# Patient Record
Sex: FEMALE | Race: OTHER | NOT HISPANIC OR LATINO | URBAN - METROPOLITAN AREA
[De-identification: names, ages, dates, MRNs, and addresses within clinical notes are randomized per-mention and may not be internally consistent; named-entity substitution may affect disease eponyms.]

---

## 2024-08-09 ENCOUNTER — OUTPATIENT (OUTPATIENT)
Dept: OUTPATIENT SERVICES | Facility: HOSPITAL | Age: 34
LOS: 1 days | End: 2024-08-09
Payer: MEDICAID

## 2024-08-09 VITALS
OXYGEN SATURATION: 98 % | TEMPERATURE: 98 F | RESPIRATION RATE: 19 BRPM | DIASTOLIC BLOOD PRESSURE: 84 MMHG | SYSTOLIC BLOOD PRESSURE: 123 MMHG | HEART RATE: 86 BPM

## 2024-08-09 DIAGNOSIS — O26.899 OTHER SPECIFIED PREGNANCY RELATED CONDITIONS, UNSPECIFIED TRIMESTER: ICD-10-CM

## 2024-08-09 LAB
APPEARANCE UR: CLEAR — SIGNIFICANT CHANGE UP
BACTERIA # UR AUTO: ABNORMAL /HPF
BILIRUB UR-MCNC: NEGATIVE — SIGNIFICANT CHANGE UP
COLOR SPEC: YELLOW — SIGNIFICANT CHANGE UP
COMMENT - URINE: SIGNIFICANT CHANGE UP
DIFF PNL FLD: NEGATIVE — SIGNIFICANT CHANGE UP
EPI CELLS # UR: PRESENT
GLUCOSE BLDC GLUCOMTR-MCNC: 115 MG/DL — HIGH (ref 70–99)
GLUCOSE UR QL: 100 MG/DL
KETONES UR-MCNC: NEGATIVE MG/DL — SIGNIFICANT CHANGE UP
LEUKOCYTE ESTERASE UR-ACNC: ABNORMAL
NITRITE UR-MCNC: NEGATIVE — SIGNIFICANT CHANGE UP
PH UR: 6.5 — SIGNIFICANT CHANGE UP (ref 5–8)
PROT UR-MCNC: NEGATIVE MG/DL — SIGNIFICANT CHANGE UP
RBC CASTS # UR COMP ASSIST: 0 /HPF — SIGNIFICANT CHANGE UP (ref 0–4)
SP GR SPEC: 1.01 — SIGNIFICANT CHANGE UP (ref 1–1.03)
UROBILINOGEN FLD QL: 1 MG/DL — SIGNIFICANT CHANGE UP (ref 0.2–1)
WBC UR QL: 3 /HPF — SIGNIFICANT CHANGE UP (ref 0–5)

## 2024-08-09 PROCEDURE — 82962 GLUCOSE BLOOD TEST: CPT

## 2024-08-09 PROCEDURE — 87086 URINE CULTURE/COLONY COUNT: CPT

## 2024-08-09 PROCEDURE — 59025 FETAL NON-STRESS TEST: CPT

## 2024-08-09 PROCEDURE — 81001 URINALYSIS AUTO W/SCOPE: CPT

## 2024-08-09 PROCEDURE — G0463: CPT

## 2024-08-09 PROCEDURE — 99213 OFFICE O/P EST LOW 20 MIN: CPT

## 2024-08-09 RX ORDER — CLOTRIMAZOLE 1 %
1 CREAM (GRAM) TOPICAL
Qty: 3 | Refills: 0
Start: 2024-08-09 | End: 2024-08-11

## 2024-08-09 NOTE — OB PROVIDER TRIAGE NOTE - ADDITIONAL INSTRUCTIONS
Continue prenatal vitamins   If water breaks, you develop contractions, or notice vaginal bleeding return to delivery room  Check the baby movements with daily fetal kick count  No sex, nothing in the vagina, no heavy lifting and no strenous activities  Modified activities: walk as tolerated to prevent clot formation  Increase hydration, drink 2-3liters of water per day; avoid caffeine beverages which can cause palpitations and dehydration  Use clotrimazole cream for itchiness, 1 applicator full of cream x 3 days preferably at bedtime   Follow up with Dr. Sommer as scheduled

## 2024-08-09 NOTE — OB RN TRIAGE NOTE - CHIEF COMPLAINT QUOTE
Frequency urinating  with vaginal itching. Denies SROM, Vaginal bleeding . Admits to fetal movements

## 2024-08-09 NOTE — OB PROVIDER TRIAGE NOTE - NSOBPROVIDERNOTE_OBGYN_ALL_OB_FT
35 yo  @ 32.4 weeks (LMP: 23, RICCARDO: 24) presenting complaining of vaginal itching, suspected yeast infection     - urinalysis negative  - clotrimazole cream for itchiness, 1 applicator full of cream x 3 days   - NST reviewed   - discussed with Dr. Moraes  -  labor precautions given   - patient expressed understanding no dysuria, no frequency, and no hematuria.

## 2024-08-09 NOTE — OB RN TRIAGE NOTE - NSNURSINGINSTR_OBGYN_ALL_OB_FT
Reviewed discharge instructions with her with verbalization of understanding.   A prescription was sent to the [facility] for treatment

## 2024-08-09 NOTE — OB RN TRIAGE NOTE - NSMATERNALFETALCONCERNS_OBGYN_ALL_OB_FT
X Size Of Lesion In Cm: 1.2 GDMA2 on Glyburide 2.5 mg tablets once daily, PNV, vitamin B12, Vitamin D

## 2024-08-09 NOTE — OB PROVIDER TRIAGE NOTE - PRINCIPAL DIAGNOSIS
Body Location Override (Optional - Billing Will Still Be Based On Selected Body Map Location If Applicable): left medial superior chest Yeast infection

## 2024-08-09 NOTE — OB PROVIDER TRIAGE NOTE - NSHPLABSRESULTS_GEN_ALL_CORE
Urinalysis Basic - ( 09 Aug 2024 04:30 )    Color: Yellow / Appearance: Clear / S.012 / pH: x  Gluc: x / Ketone: Negative mg/dL  / Bili: Negative / Urobili: 1.0 mg/dL   Blood: x / Protein: Negative mg/dL / Nitrite: Negative   Leuk Esterase: Small / RBC: 0 /HPF / WBC 3 /HPF   Sq Epi: x / Non Sq Epi: x / Bacteria: Moderate /HPF

## 2024-08-09 NOTE — OB PROVIDER TRIAGE NOTE - HISTORY OF PRESENT ILLNESS
35 yo  @ 32.4 weeks (LMP: 23, RICCARDO: 24) presenting complaining of vaginal itching. Reports itching over the past two days with thick white discharge. Reports mild burning when peeing on after scratching herself and feels her vulva may be swollen from scratching. Reports she was seen by Dr. Sommer last week with the same symptoms and used vaginal suppository for 2 days.  Reports + fetal movement. Denies vaginal bleeding, loss of fluid, contractions. Denies headache, visual disturbances, epigastric pain, chest pain, shortness of breath, N/V/D/C, fever, chills, abdominal pain    PNC: follows with [facility], complicated by GDMA2 on glyburide   OBHx: denies   GynHx: Reports possible STD and she received antibiotic treatment. Denies fibroids, cysts, abnormal PAP  PMHx: PCOS  Meds: PNV   vitamin D  vitamin B12  Glyburide 2.5 mg tablets once daily  PSHx: denies   Allergies: no known allergies

## 2024-08-09 NOTE — OB PROVIDER TRIAGE NOTE - NSHPPHYSICALEXAM_GEN_ALL_CORE
Vital Signs Last 24 Hrs  T(C): 36.5 (09 Aug 2024 04:32), Max: 36.8 (09 Aug 2024 03:47)  T(F): 97.7 (09 Aug 2024 04:32), Max: 98.2 (09 Aug 2024 03:47)  HR: 86 (09 Aug 2024 04:32) (80 - 86)  BP: 123/84 (09 Aug 2024 04:32) (123/84 - 135/86)  BP(mean): -RR: 19 (09 Aug 2024 04:32) (18 - 19)  SpO2: 98% (09 Aug 2024 04:32) (98% - 99%)  Parameters below as of 09 Aug 2024 04:32  Patient On (Oxygen Delivery Method): room air    General: patient is resting comfortably in bed, NAD, A&Ox3  Cardiac: RRR  Pulmonary: clear to auscultation throughout   Abdominal: gravid uterus, soft nontender, no guarding or rebound tenderness  Vaginal: no blood or fluid, no visible discharge   Extremities: no edema, patellar reflexes 2+ b/l    FHT: baseline: 150, moderate variability, + accels, no decels  occasional ctx

## 2024-08-10 LAB
CULTURE RESULTS: SIGNIFICANT CHANGE UP
SPECIMEN SOURCE: SIGNIFICANT CHANGE UP

## 2024-08-12 DIAGNOSIS — O23.593 INFECTION OF OTHER PART OF GENITAL TRACT IN PREGNANCY, THIRD TRIMESTER: ICD-10-CM

## 2024-08-12 DIAGNOSIS — N76.0 ACUTE VAGINITIS: ICD-10-CM

## 2024-08-12 DIAGNOSIS — O99.283 ENDOCRINE, NUTRITIONAL AND METABOLIC DISEASES COMPLICATING PREGNANCY, THIRD TRIMESTER: ICD-10-CM

## 2024-08-12 DIAGNOSIS — Z3A.32 32 WEEKS GESTATION OF PREGNANCY: ICD-10-CM

## 2024-08-12 DIAGNOSIS — O24.415 GESTATIONAL DIABETES MELLITUS IN PREGNANCY, CONTROLLED BY ORAL HYPOGLYCEMIC DRUGS: ICD-10-CM

## 2024-08-12 DIAGNOSIS — E28.2 POLYCYSTIC OVARIAN SYNDROME: ICD-10-CM

## 2024-08-14 ENCOUNTER — INPATIENT (INPATIENT)
Facility: HOSPITAL | Age: 34
LOS: 7 days | Discharge: HOME | DRG: 951 | End: 2024-08-22
Attending: OBSTETRICS & GYNECOLOGY | Admitting: OBSTETRICS & GYNECOLOGY
Payer: MEDICAID

## 2024-08-14 VITALS
OXYGEN SATURATION: 98 % | HEART RATE: 77 BPM | DIASTOLIC BLOOD PRESSURE: 93 MMHG | SYSTOLIC BLOOD PRESSURE: 137 MMHG | TEMPERATURE: 98 F | RESPIRATION RATE: 18 BRPM

## 2024-08-14 DIAGNOSIS — O60.00 PRETERM LABOR WITHOUT DELIVERY, UNSPECIFIED TRIMESTER: ICD-10-CM

## 2024-08-14 DIAGNOSIS — O24.419 GESTATIONAL DIABETES MELLITUS IN PREGNANCY, UNSPECIFIED CONTROL: ICD-10-CM

## 2024-08-14 DIAGNOSIS — Z34.80 ENCOUNTER FOR SUPERVISION OF OTHER NORMAL PREGNANCY, UNSPECIFIED TRIMESTER: ICD-10-CM

## 2024-08-14 DIAGNOSIS — O26.899 OTHER SPECIFIED PREGNANCY RELATED CONDITIONS, UNSPECIFIED TRIMESTER: ICD-10-CM

## 2024-08-14 LAB
ALBUMIN SERPL ELPH-MCNC: 2.4 G/DL — LOW (ref 3.5–5)
ALP SERPL-CCNC: 88 U/L — SIGNIFICANT CHANGE UP (ref 40–120)
ALT FLD-CCNC: 18 U/L DA — SIGNIFICANT CHANGE UP (ref 10–60)
ANION GAP SERPL CALC-SCNC: 5 MMOL/L — SIGNIFICANT CHANGE UP (ref 5–17)
APPEARANCE UR: ABNORMAL
APTT BLD: 28.5 SEC — SIGNIFICANT CHANGE UP (ref 24.5–35.6)
AST SERPL-CCNC: 15 U/L — SIGNIFICANT CHANGE UP (ref 10–40)
BACTERIA # UR AUTO: ABNORMAL /HPF
BASOPHILS # BLD AUTO: 0.01 K/UL — SIGNIFICANT CHANGE UP (ref 0–0.2)
BASOPHILS NFR BLD AUTO: 0.1 % — SIGNIFICANT CHANGE UP (ref 0–2)
BILIRUB SERPL-MCNC: 0.3 MG/DL — SIGNIFICANT CHANGE UP (ref 0.2–1.2)
BILIRUB UR-MCNC: NEGATIVE — SIGNIFICANT CHANGE UP
BUN SERPL-MCNC: 9 MG/DL — SIGNIFICANT CHANGE UP (ref 7–18)
CALCIUM SERPL-MCNC: 9.4 MG/DL — SIGNIFICANT CHANGE UP (ref 8.4–10.5)
CHLORIDE SERPL-SCNC: 107 MMOL/L — SIGNIFICANT CHANGE UP (ref 96–108)
CO2 SERPL-SCNC: 23 MMOL/L — SIGNIFICANT CHANGE UP (ref 22–31)
COLOR SPEC: YELLOW — SIGNIFICANT CHANGE UP
CREAT ?TM UR-MCNC: 28 MG/DL — SIGNIFICANT CHANGE UP
CREAT SERPL-MCNC: 0.56 MG/DL — SIGNIFICANT CHANGE UP (ref 0.5–1.3)
DIFF PNL FLD: ABNORMAL
EGFR: 124 ML/MIN/1.73M2 — SIGNIFICANT CHANGE UP
EOSINOPHIL # BLD AUTO: 0.03 K/UL — SIGNIFICANT CHANGE UP (ref 0–0.5)
EOSINOPHIL NFR BLD AUTO: 0.3 % — SIGNIFICANT CHANGE UP (ref 0–6)
EPI CELLS # UR: PRESENT
FIBRINOGEN PPP-MCNC: 619 MG/DL — HIGH (ref 200–475)
GLUCOSE BLDC GLUCOMTR-MCNC: 133 MG/DL — HIGH (ref 70–99)
GLUCOSE BLDC GLUCOMTR-MCNC: 148 MG/DL — HIGH (ref 70–99)
GLUCOSE BLDC GLUCOMTR-MCNC: 150 MG/DL — HIGH (ref 70–99)
GLUCOSE BLDC GLUCOMTR-MCNC: 156 MG/DL — HIGH (ref 70–99)
GLUCOSE BLDC GLUCOMTR-MCNC: 162 MG/DL — HIGH (ref 70–99)
GLUCOSE BLDC GLUCOMTR-MCNC: 167 MG/DL — HIGH (ref 70–99)
GLUCOSE BLDC GLUCOMTR-MCNC: 169 MG/DL — HIGH (ref 70–99)
GLUCOSE BLDC GLUCOMTR-MCNC: 169 MG/DL — HIGH (ref 70–99)
GLUCOSE BLDC GLUCOMTR-MCNC: 173 MG/DL — HIGH (ref 70–99)
GLUCOSE BLDC GLUCOMTR-MCNC: 185 MG/DL — HIGH (ref 70–99)
GLUCOSE BLDC GLUCOMTR-MCNC: 189 MG/DL — HIGH (ref 70–99)
GLUCOSE BLDC GLUCOMTR-MCNC: 196 MG/DL — HIGH (ref 70–99)
GLUCOSE BLDC GLUCOMTR-MCNC: 99 MG/DL — SIGNIFICANT CHANGE UP (ref 70–99)
GLUCOSE SERPL-MCNC: 110 MG/DL — HIGH (ref 70–99)
GLUCOSE UR QL: NEGATIVE MG/DL — SIGNIFICANT CHANGE UP
HCT VFR BLD CALC: 37.4 % — SIGNIFICANT CHANGE UP (ref 34.5–45)
HGB BLD-MCNC: 12.5 G/DL — SIGNIFICANT CHANGE UP (ref 11.5–15.5)
HIV 1 & 2 AB SERPL IA.RAPID: SIGNIFICANT CHANGE UP
HIV 1+2 AB+HIV1 P24 AG SERPL QL IA: SIGNIFICANT CHANGE UP
IMM GRANULOCYTES NFR BLD AUTO: 0.3 % — SIGNIFICANT CHANGE UP (ref 0–0.9)
INR BLD: 0.96 RATIO — SIGNIFICANT CHANGE UP (ref 0.85–1.18)
KETONES UR-MCNC: NEGATIVE MG/DL — SIGNIFICANT CHANGE UP
LDH SERPL L TO P-CCNC: 231 U/L — HIGH (ref 120–225)
LEUKOCYTE ESTERASE UR-ACNC: ABNORMAL
LYMPHOCYTES # BLD AUTO: 2.09 K/UL — SIGNIFICANT CHANGE UP (ref 1–3.3)
LYMPHOCYTES # BLD AUTO: 20.8 % — SIGNIFICANT CHANGE UP (ref 13–44)
MCHC RBC-ENTMCNC: 26.8 PG — LOW (ref 27–34)
MCHC RBC-ENTMCNC: 33.4 GM/DL — SIGNIFICANT CHANGE UP (ref 32–36)
MCV RBC AUTO: 80.3 FL — SIGNIFICANT CHANGE UP (ref 80–100)
MONOCYTES # BLD AUTO: 0.98 K/UL — HIGH (ref 0–0.9)
MONOCYTES NFR BLD AUTO: 9.8 % — SIGNIFICANT CHANGE UP (ref 2–14)
NEUTROPHILS # BLD AUTO: 6.91 K/UL — SIGNIFICANT CHANGE UP (ref 1.8–7.4)
NEUTROPHILS NFR BLD AUTO: 68.7 % — SIGNIFICANT CHANGE UP (ref 43–77)
NITRITE UR-MCNC: NEGATIVE — SIGNIFICANT CHANGE UP
NRBC # BLD: 0 /100 WBCS — SIGNIFICANT CHANGE UP (ref 0–0)
PH UR: 6.5 — SIGNIFICANT CHANGE UP (ref 5–8)
PLATELET # BLD AUTO: 146 K/UL — LOW (ref 150–400)
POTASSIUM SERPL-MCNC: 4.3 MMOL/L — SIGNIFICANT CHANGE UP (ref 3.5–5.3)
POTASSIUM SERPL-SCNC: 4.3 MMOL/L — SIGNIFICANT CHANGE UP (ref 3.5–5.3)
PROT ?TM UR-MCNC: <5 MG/DL — SIGNIFICANT CHANGE UP (ref 0–12)
PROT SERPL-MCNC: 7.1 G/DL — SIGNIFICANT CHANGE UP (ref 6–8.3)
PROT UR-MCNC: 100 MG/DL
PROT/CREAT UR-RTO: <0.2 RATIO — SIGNIFICANT CHANGE UP (ref 0–0.2)
PROTHROM AB SERPL-ACNC: 11 SEC — SIGNIFICANT CHANGE UP (ref 9.5–13)
RBC # BLD: 4.66 M/UL — SIGNIFICANT CHANGE UP (ref 3.8–5.2)
RBC # FLD: 12.4 % — SIGNIFICANT CHANGE UP (ref 10.3–14.5)
RBC CASTS # UR COMP ASSIST: 1 /HPF — SIGNIFICANT CHANGE UP (ref 0–4)
SODIUM SERPL-SCNC: 135 MMOL/L — SIGNIFICANT CHANGE UP (ref 135–145)
SP GR SPEC: 1.02 — SIGNIFICANT CHANGE UP (ref 1–1.03)
T PALLIDUM AB TITR SER: NEGATIVE — SIGNIFICANT CHANGE UP
URATE SERPL-MCNC: 3.6 MG/DL — SIGNIFICANT CHANGE UP (ref 2.5–7)
UROBILINOGEN FLD QL: 1 MG/DL — SIGNIFICANT CHANGE UP (ref 0.2–1)
WBC # BLD: 10.05 K/UL — SIGNIFICANT CHANGE UP (ref 3.8–10.5)
WBC # FLD AUTO: 10.05 K/UL — SIGNIFICANT CHANGE UP (ref 3.8–10.5)
WBC UR QL: >50 /HPF — HIGH (ref 0–5)

## 2024-08-14 PROCEDURE — 76815 OB US LIMITED FETUS(S): CPT | Mod: 26

## 2024-08-14 PROCEDURE — 76819 FETAL BIOPHYS PROFIL W/O NST: CPT | Mod: 26

## 2024-08-14 PROCEDURE — 99253 IP/OBS CNSLTJ NEW/EST LOW 45: CPT

## 2024-08-14 RX ORDER — FAMOTIDINE 10 MG/ML
20 INJECTION INTRAVENOUS
Refills: 0 | Status: DISCONTINUED | OUTPATIENT
Start: 2024-08-14 | End: 2024-08-19

## 2024-08-14 RX ORDER — INSULIN REGULAR, HUMAN 100/ML (3)
1.5 INSULIN PEN (ML) SUBCUTANEOUS
Qty: 100 | Refills: 0 | Status: DISCONTINUED | OUTPATIENT
Start: 2024-08-14 | End: 2024-08-15

## 2024-08-14 RX ORDER — SODIUM CHLORIDE 9 MG/ML
1000 INJECTION INTRAMUSCULAR; INTRAVENOUS; SUBCUTANEOUS
Refills: 0 | Status: DISCONTINUED | OUTPATIENT
Start: 2024-08-14 | End: 2024-08-15

## 2024-08-14 RX ORDER — AMPICILLIN TRIHYDRATE 500 MG
2 CAPSULE ORAL EVERY 6 HOURS
Refills: 0 | Status: DISCONTINUED | OUTPATIENT
Start: 2024-08-14 | End: 2024-08-15

## 2024-08-14 RX ORDER — DEXTROSE 15 G/33 G
25 GEL IN PACKET (GRAM) ORAL ONCE
Refills: 0 | Status: DISCONTINUED | OUTPATIENT
Start: 2024-08-14 | End: 2024-08-15

## 2024-08-14 RX ORDER — DEXTROSE 15 G/33 G
25 GEL IN PACKET (GRAM) ORAL
Refills: 0 | Status: DISCONTINUED | OUTPATIENT
Start: 2024-08-14 | End: 2024-08-15

## 2024-08-14 RX ORDER — DEXTROSE 15 G/33 G
15 GEL IN PACKET (GRAM) ORAL ONCE
Refills: 0 | Status: DISCONTINUED | OUTPATIENT
Start: 2024-08-14 | End: 2024-08-15

## 2024-08-14 RX ORDER — DEXTROSE 15 G/33 G
50 GEL IN PACKET (GRAM) ORAL
Refills: 0 | Status: DISCONTINUED | OUTPATIENT
Start: 2024-08-14 | End: 2024-08-15

## 2024-08-14 RX ORDER — ACETAMINOPHEN 325 MG/1
650 TABLET ORAL EVERY 6 HOURS
Refills: 0 | Status: DISCONTINUED | OUTPATIENT
Start: 2024-08-14 | End: 2024-08-19

## 2024-08-14 RX ORDER — OXYTOCIN 10 UNIT/ML
333.33 AMPUL (ML) INJECTION
Qty: 20 | Refills: 0 | Status: DISCONTINUED | OUTPATIENT
Start: 2024-08-14 | End: 2024-08-15

## 2024-08-14 RX ORDER — DEXTROSE 15 G/33 G
12.5 GEL IN PACKET (GRAM) ORAL ONCE
Refills: 0 | Status: DISCONTINUED | OUTPATIENT
Start: 2024-08-14 | End: 2024-08-15

## 2024-08-14 RX ORDER — ACETAMINOPHEN 325 MG/1
1000 TABLET ORAL ONCE
Refills: 0 | Status: COMPLETED | OUTPATIENT
Start: 2024-08-14 | End: 2024-08-14

## 2024-08-14 RX ORDER — AMPICILLIN TRIHYDRATE 500 MG
1 CAPSULE ORAL EVERY 4 HOURS
Refills: 0 | Status: DISCONTINUED | OUTPATIENT
Start: 2024-08-14 | End: 2024-08-14

## 2024-08-14 RX ORDER — CHLORHEXIDINE GLUCONATE 40 MG/ML
1 SOLUTION TOPICAL DAILY
Refills: 0 | Status: DISCONTINUED | OUTPATIENT
Start: 2024-08-14 | End: 2024-08-15

## 2024-08-14 RX ORDER — INSULIN GLARGINE 100 [IU]/ML
20 INJECTION, SOLUTION SUBCUTANEOUS AT BEDTIME
Refills: 0 | Status: DISCONTINUED | OUTPATIENT
Start: 2024-08-14 | End: 2024-08-15

## 2024-08-14 RX ORDER — SODIUM CHLORIDE 9 MG/ML
1000 INJECTION INTRAMUSCULAR; INTRAVENOUS; SUBCUTANEOUS ONCE
Refills: 0 | Status: COMPLETED | OUTPATIENT
Start: 2024-08-14 | End: 2024-08-14

## 2024-08-14 RX ORDER — INSULIN REGULAR, HUMAN 100/ML (3)
1 INSULIN PEN (ML) SUBCUTANEOUS ONCE
Refills: 0 | Status: DISCONTINUED | OUTPATIENT
Start: 2024-08-14 | End: 2024-08-14

## 2024-08-14 RX ORDER — GLUCAGON INJECTION, SOLUTION 1 MG/.2ML
1 INJECTION, SOLUTION SUBCUTANEOUS ONCE
Refills: 0 | Status: DISCONTINUED | OUTPATIENT
Start: 2024-08-14 | End: 2024-08-15

## 2024-08-14 RX ORDER — SODIUM CITRATE AND CITRIC ACID MONOHYDRATE 334; 500 MG/5ML; MG/5ML
15 SOLUTION ORAL EVERY 6 HOURS
Refills: 0 | Status: DISCONTINUED | OUTPATIENT
Start: 2024-08-14 | End: 2024-08-15

## 2024-08-14 RX ORDER — BETAMETHASONE VALERATE
12 POWDER (GRAM) MISCELLANEOUS EVERY 24 HOURS
Refills: 0 | Status: COMPLETED | OUTPATIENT
Start: 2024-08-14 | End: 2024-08-15

## 2024-08-14 RX ORDER — NIFEDIPINE 60 MG/1
10 TABLET, FILM COATED, EXTENDED RELEASE ORAL EVERY 6 HOURS
Refills: 0 | Status: COMPLETED | OUTPATIENT
Start: 2024-08-14 | End: 2024-08-16

## 2024-08-14 RX ORDER — AMPICILLIN TRIHYDRATE 500 MG
2 CAPSULE ORAL ONCE
Refills: 0 | Status: COMPLETED | OUTPATIENT
Start: 2024-08-14 | End: 2024-08-14

## 2024-08-14 RX ADMIN — ACETAMINOPHEN 1000 MILLIGRAM(S): 325 TABLET ORAL at 15:15

## 2024-08-14 RX ADMIN — SODIUM CHLORIDE 250 MILLILITER(S): 9 INJECTION INTRAMUSCULAR; INTRAVENOUS; SUBCUTANEOUS at 11:56

## 2024-08-14 RX ADMIN — SODIUM CHLORIDE 1000 MILLILITER(S): 9 INJECTION INTRAMUSCULAR; INTRAVENOUS; SUBCUTANEOUS at 10:55

## 2024-08-14 RX ADMIN — FAMOTIDINE 20 MILLIGRAM(S): 10 INJECTION INTRAVENOUS at 23:36

## 2024-08-14 RX ADMIN — ACETAMINOPHEN 400 MILLIGRAM(S): 325 TABLET ORAL at 14:40

## 2024-08-14 RX ADMIN — NIFEDIPINE 10 MILLIGRAM(S): 60 TABLET, FILM COATED, EXTENDED RELEASE ORAL at 22:25

## 2024-08-14 RX ADMIN — Medication 12 MILLIGRAM(S): at 09:30

## 2024-08-14 RX ADMIN — Medication 1.5 UNIT(S)/HR: at 14:14

## 2024-08-14 RX ADMIN — SODIUM CHLORIDE 250 MILLILITER(S): 9 INJECTION INTRAMUSCULAR; INTRAVENOUS; SUBCUTANEOUS at 22:00

## 2024-08-14 RX ADMIN — Medication 108 GRAM(S): at 19:41

## 2024-08-14 RX ADMIN — Medication 200 GRAM(S): at 11:10

## 2024-08-14 RX ADMIN — INSULIN GLARGINE 20 UNIT(S): 100 INJECTION, SOLUTION SUBCUTANEOUS at 23:28

## 2024-08-14 RX ADMIN — Medication 108 GRAM(S): at 15:38

## 2024-08-14 RX ADMIN — SODIUM CITRATE AND CITRIC ACID MONOHYDRATE 15 MILLILITER(S): 334; 500 SOLUTION ORAL at 16:06

## 2024-08-14 RX ADMIN — Medication 125 MILLILITER(S): at 14:14

## 2024-08-14 NOTE — OB RN TRIAGE NOTE - FALL HARM RISK - UNIVERSAL INTERVENTIONS
Bed in lowest position, wheels locked, appropriate side rails in place/Call bell, personal items and telephone in reach/Instruct patient to call for assistance before getting out of bed or chair/Non-slip footwear when patient is out of bed/[place] to call system/Physically safe environment - no spills, clutter or unnecessary equipment/Purposeful Proactive Rounding/Room/bathroom lighting operational, light cord in reach

## 2024-08-14 NOTE — OB PROVIDER H&P - ALERT: PERTINENT HISTORY
Amniocentesis/BioPhysical Profile(s)/Non Invasive Prenatal Screen (NIPS)/Fetal Non-Stress Test (NST)

## 2024-08-14 NOTE — OB RN PATIENT PROFILE - NSSDOHUTIL_OBGYN_A_OB
Spoke w/ pt, advised of provider notes  Patient verbalized understanding  No further questions.   Lab ordered no

## 2024-08-14 NOTE — OB PROVIDER LABOR PROGRESS NOTE - NS_SUBJECTIVE/OBJECTIVE_OBGYN_ALL_OB_FT
luis done  fs 173 insulin drip at 1.5 units per protocol  /81  patient denies pain at this time  nst cat I  toco no contractions    instructed by Dr. Petersen to obtain bpp with efw  continue current mngt

## 2024-08-14 NOTE — CONSULT NOTE PEDS - ASSESSMENT
Ms. Collado is a 33 y/o  admitted at 33w2d gestational age with frequent contractions, concerning for  labor. No ROM, no vaginal bleeding. Maternal history unremarkable and current pregnancy complicated by GDMA2 (currently on Glyburide daily). OB team is in process of obtaining prenatal records. Ms. Collado has been started on ampicillin and given betamethasone x1 so far.    I met with Ms. Collado and her partner*** and discussed what to expect should she deliver at 33.2 weeks gestation. We discussed the followin. The NICU team will be present at her delivery and will immediately assess and care for her infant.    2. The infant may require respiratory support, most commonly in the form of nasal CPAP. While unlikely, there is a small possibility that the infant would require intubation and mechanical ventilation.    3. Depending on the clinical status of the infant, enteral feedings may or may not be started immediately. The infant will receive IVF/IV nutrition as necessary. Due to immature suck/swallow, orogastric or nasogastric tube may be required once feeds are initiated. The infant is also at risk for hypoglycemia due to prematurity.    4. Discussed the benefits of breastfeeding in  infants and encouraged mother to pump following delivery.    5. Due to prematurity, the infant is at increased risk for jaundice, which can be treated with phototherapy.    6. The infant will be screened for infection and treated with antibiotics if deemed clinically necessary.    7. The infant is at risk for developmental delays as a consequence of prematurity. The infant will be evaluated by a developmental pediatrician to monitor for neurodevelopmental delays.    8. Thermoregulation issues and need to be in an isolette with slow weaning to a crib discussed.    9. The infant is at risk for developmental delays as a consequence of prematurity. The infant will be evaluated by a developmental pediatrician to monitor for neurodevelopmental delays.    10. Length of stay is highly variable, but given the infant’s weight and gestational age, the family may expect an average stay of about 2 weeks. Reviewed discharge criteria.    Ms. Collado and her partner had the opportunity to ask questions and may contact the NICU at any time if further questions arise.    Thank you for the opportunity to participate in the care of this patient and please inform us of any changes in her status.   Ms. Collado is a 35 y/o  admitted at 33w2d gestational age with frequent contractions, concerning for  labor. No ROM, no vaginal bleeding. Maternal history significant for PCOS and current pregnancy complicated by GDMA2 (currently on Glyburide daily). Prenatal history significant for amniocentesis FISH testing as mother is an SMA carrier and father is a silent alpha thalassemia carrier; FISH results was XY, normal. NIPT low risk. Anatomy scan unremarkable; EFW at 20w5d was 401 g (66th percentile). During current admission, UA concerning for UTI. Ms. Collado has been started on ampicillin and given betamethasone x1 so far. Pending bedside U/S for this admission. OB team discussed patient with [facility].     I met with Ms. Collado and her partner and discussed what to expect should she deliver at 33.2 weeks gestation. We discussed the followin. The NICU team will be present at her delivery and will immediately assess and care for her infant.    2. The infant may require respiratory support, most commonly in the form of nasal CPAP. While unlikely, there is a small possibility that the infant would require intubation and mechanical ventilation.    3. Depending on the clinical status of the infant, enteral feedings may or may not be started immediately. The infant will receive IVF/ IV nutrition as necessary. Due to immature suck/swallow, orogastric or nasogastric tube may be required once feeds are initiated. The infant is also at risk for hypoglycemia due to prematurity.    4. Discussed the benefits of breastfeeding in  infants and encouraged mother to pump following delivery.    5. Due to prematurity, the infant is at increased risk for jaundice, which can be treated with phototherapy.    6. The infant will be screened for infection and treated with antibiotics if deemed clinically necessary.    7. The infant is at risk for developmental delays as a consequence of prematurity. The infant will be evaluated by a developmental pediatrician outpatient to monitor for neurodevelopmental delays.    8. Thermoregulation issues and need to be in an isolette with slow weaning to a crib discussed.    9. Length of stay is highly variable, but given the infant’s weight and gestational age, the family may expect an average stay of about 2 weeks. Reviewed discharge criteria.    Ms. Collado and her partner had the opportunity to ask questions and may contact the NICU at any time if further questions arise.    Thank you for the opportunity to participate in the care of this patient and please inform us of any changes in her status.

## 2024-08-14 NOTE — OB PROVIDER LABOR PROGRESS NOTE - NS_SUBJECTIVE/OBJECTIVE_OBGYN_ALL_OB_FT
reviewed patient with Dr. Mccall MFM  agrees with ampillicin, beta, fs q2  REcommendations:  noted to have elevated BPs, plt 146, alt 18/ast 15 cr 0.5/fib 619 gestational thrombocytopenia   instructed to add ldh, uric acid, p/c ratio r/o preeclampsia   fs 196, patient had dough and chickpea, s/p beta  fetal tachycardia noted with resolution s/p 1L NS bolus   wbc 10 patient is afebrile  UTI +  consider causes of fetal tachycardia subclinical chorio vs abruption    continue fs q2hrs  repeat exam in 2hrs, consider tocolysis if nst cat I  inder consult obtained  dw Dr. [place] attending

## 2024-08-14 NOTE — OB RN PATIENT PROFILE - FALL HARM RISK - FALL HARM RISK
[facility] Emergency Department    [address] [place] [address]    Phone:  [phone]    Fax:  [phone]                                       Dago Dangelo   MRN: [id number]    Department:  [facility] Emergency Department   Date of Visit:  2/19/2018           Patient Information     Date Of Birth          1958        Your diagnoses for this visit were:     Chest pain, unspecified type     Coronary artery disease involving native coronary artery of native heart with unstable angina pectoris (H)        You were seen by Jesse Ingram MD.        Discharge Instructions       Discharge Instructions  Chest Pain    You have been seen today for chest pain or discomfort.  At this time, your doctor has found no signs that your chest pain is due to a serious or life-threatening condition, (or you have declined more testing and/or admission to the hospital). However, sometimes there is a serious problem that does not show up right away. Your evaluation today may not be complete and you may need further testing and evaluation.     You need to follow-up with your regular doctor within 3 days.    Return to the Emergency Department if:    Your chest pain changes, gets worse, starts to happen more often, or comes with less activity.    You are short of breath.    You get very weak or tired.    You pass out or faint.    You have any new symptoms, like fever, cough, numb legs, or you cough up blood.    You have anything else that worries you.    Until you follow-up with your regular doctor please do the following:    Take one aspirin daily unless you have an allergy or are told not to by your doctor.    If a stress test appointment has been made, go to the appointment.    If you have questions, contact your regular doctor.    If your doctor today has told you to follow-up with your regular doctor, it is very important that you make an appointment with your clinic  No indicators present and go to the appointment.  If you do not follow-up with your primary doctor, it may result in missing an important development which could result in permanent injury or disability and/or lasting pain.  If there is any problem keeping your appointment, call your doctor or return to the Emergency Department.    If you were given a prescription for medicine here today, be sure to read all of the information (including the package insert) that comes with your prescription.  This will include important information about the medicine, its side effects, and any warnings that you need to know about.  The pharmacist who fills the prescription can provide more information and answer questions you may have about the medicine.  If you have questions or concerns that the pharmacist cannot address, please call or return to the Emergency Department.       Remember that you can always come back to the Emergency Department if you are not able to see your regular doctor in the amount of time listed above, if you get any new symptoms, or if there is anything that worries you.          24 Hour Appointment Hotline       To make an appointment at any [facility], call [phone] ([phone]). If you don't have a family doctor or clinic, we will help you find one. [facility] clinics are conveniently located to serve the needs of you and your family.          ED Discharge Orders     NM Adenosine stress test                    Review of your medicines      Our records show that you are taking the medicines listed below. If these are incorrect, please call your family doctor or clinic.        Dose / Directions Last dose taken    * ACCU-CHEK COMPLETE Kit   Dose:  1 Box   Quantity:  1 Box        1 Box 2 times daily Measure blood sugar 2x daily   Refills:  0        * blood glucose monitoring meter device kit   Commonly known as:  no brand specified   Quantity:  1 kit        Use to test blood sugar 2 times daily or as directed. PT HAS  NEW PLAN COVERAGE EFFECTIVE 9/1/16, COVERS CONTOUR PRODUCTS   Refills:  0        acyclovir 400 MG tablet   Commonly known as:  ZOVIRAX   Dose:  400 mg   Quantity:  30 tablet        Take 1 tablet (400 mg) by mouth 3 times daily   Refills:  0        amLODIPine 5 MG tablet   Commonly known as:  NORVASC   Quantity:  180 tablet        TAKE TWO TABLETS BY MOUTH EVERY DAY   Refills:  1        ASPIRIN LOW DOSE 81 MG EC tablet   Quantity:  100 tablet   Generic drug:  aspirin        TAKE ONE TABLET BY MOUTH EVERY DAY   Refills:  2        atorvastatin 40 MG tablet   Commonly known as:  LIPITOR   Quantity:  90 tablet        TAKE ONE TABLET BY MOUTH EVERY DAY AT BEDTIME   Refills:  2        blood glucose calibration solution   Commonly known as:  no brand specified   Quantity:  1 each        Use to calibrate blood glucose monitor as directed.   Refills:  0        blood glucose lancets standard   Commonly known as:  no brand specified   Quantity:  100 each        Use to test blood sugar 2 times daily or as directed.   Refills:  11        blood glucose monitoring test strip   Commonly known as:  no brand specified   Quantity:  3 Box        Use to test blood sugars 2 times daily or as directed-DISPENSE PER PT INSURANCE PER PT PREFERENCE, EFFECTIVE 9/1/16 PT HAS INSURANCE CHANGE TO [employer]ODU[employer]   Refills:  0        carvedilol 25 MG tablet   Commonly known as:  COREG   Dose:  25 mg   Quantity:  180 tablet        Take 1 tablet (25 mg) by mouth 2 times daily (with meals)   Refills:  3        losartan 100 MG tablet   Commonly known as:  COZAAR   Quantity:  90 tablet        TAKE ONE TABLET BY MOUTH EVERY DAY   Refills:  1        metFORMIN 500 MG tablet   Commonly known as:  GLUCOPHAGE   Dose:  500 mg   Quantity:  180 tablet        Take 1 tablet (500 mg) by mouth 2 times daily (with meals)   Refills:  0        multivitamin, therapeutic with minerals Tabs tablet   Dose:  1 tablet        Take 1 tablet by mouth daily   Refills:  0         nitroGLYcerin 0.4 MG sublingual tablet   Commonly known as:  NITROSTAT   Dose:  0.4 mg   Quantity:  25 tablet        Place 1 tablet (0.4 mg) under the tongue every 5 minutes as needed for chest pain if you are still having symptoms after 3 doses (15 minutes) call 911.   Refills:  1        order for DME   Quantity:  1 Device        Blood pressure cuff   Refills:  0        ticagrelor 90 MG tablet   Commonly known as:  BRILINTA   Dose:  90 mg   Quantity:  180 tablet        Take 1 tablet (90 mg) by mouth every 12 hours   Refills:  3        * Notice:  This list has 2 medication(s) that are the same as other medications prescribed for you. Read the directions carefully, and ask your doctor or other care provider to review them with you.            Procedures and tests performed during your visit     Procedure/Test Number of Times Performed    Basic metabolic panel 1    CBC with platelets differential 1    Cardiac Continuous Monitoring 1    D dimer quantitative 1    EKG 12 lead 2    Nt probnp inpatient (BNP) 1    Peripheral IV: Standard 1    Pulse oximetry nursing 1    Troponin I 1    Troponin POCT 2    XR Chest 2 Views 1      Orders Needing Specimen Collection     None      Pending Results     Date and Time Order Name Status Description    2/19/2018 1259 EKG 12 lead Preliminary     2/19/2018 1245 EKG 12 lead Preliminary     2/19/2018 1203 XR Chest 2 Views Preliminary             Pending Culture Results     No orders found from 2/17/2018 to 2/20/2018.            Pending Results Instructions     If you had any lab results that were not finalized at the time of your Discharge, you can call the ED Lab Result RN at [phone]. You will be contacted by this team for any positive Lab results or changes in treatment. The nurses are available 7 days a week from 10A to 6:30P.  You can leave a message 24 hours per day and they will return your call.        Test Results From Your Hospital Stay        2/19/2018 12:20 PM       Component Results     Component Value Ref Range & Units Status    WBC 8.6 4.0 - 11.0 10e9/L Final    RBC Count 4.37 (L) 4.4 - 5.9 10e12/L Final    Hemoglobin 13.7 13.3 - 17.7 g/dL Final    Hematocrit 41.2 40.0 - 53.0 % Final    MCV 94 78 - 100 fl Final    MCH 31.4 26.5 - 33.0 pg Final    MCHC 33.3 31.5 - 36.5 g/dL Final    RDW 13.2 10.0 - 15.0 % Final    Platelet Count 307 150 - 450 10e9/L Final    Diff Method Automated Method  Final    % Neutrophils 65.6 % Final    % Lymphocytes 23.2 % Final    % Monocytes 7.9 % Final    % Eosinophils 2.2 % Final    % Basophils 0.5 % Final    % Immature Granulocytes 0.6 % Final    Nucleated RBCs 0 0 /100 Final    Absolute Neutrophil 5.6 1.6 - 8.3 10e9/L Final    Absolute Lymphocytes 2.0 0.8 - 5.3 10e9/L Final    Absolute Monocytes 0.7 0.0 - 1.3 10e9/L Final    Absolute Eosinophils 0.2 0.0 - 0.7 10e9/L Final    Absolute Basophils 0.0 0.0 - 0.2 10e9/L Final    Abs Immature Granulocytes 0.1 0 - 0.4 10e9/L Final    Absolute Nucleated RBC 0.0  Final         2/19/2018 12:42 PM      Component Results     Component Value Ref Range & Units Status    Sodium 137 133 - 144 mmol/L Final    Potassium 4.0 3.4 - 5.3 mmol/L Final    Chloride 105 94 - 109 mmol/L Final    Carbon Dioxide 26 20 - 32 mmol/L Final    Anion Gap 6 3 - 14 mmol/L Final    Glucose 179 (H) 70 - 99 mg/dL Final    Urea Nitrogen 15 7 - 30 mg/dL Final    Creatinine 0.89 0.66 - 1.25 mg/dL Final    GFR Estimate 88 >60 mL/min/1.7m2 Final    Non  GFR Calc    GFR Estimate If Black >90 >60 mL/min/1.7m2 Final    African American GFR Calc    Calcium 9.1 8.5 - 10.1 mg/dL Final         2/19/2018 12:42 PM      Component Results     Component Value Ref Range & Units Status    Troponin I ES <0.015 0.000 - 0.045 ug/L Final    The 99th percentile for upper reference range is 0.045 ug/L.  Troponin values   in the range of 0.045 - 0.120 ug/L may be associated with risks of adverse   clinical events.           2/19/2018 12:33 PM       Component Results     Component Value Ref Range & Units Status    D Dimer 0.4 0.0 - 0.50 ug/ml FEU Final    This D-dimer assay is intended for use in conjunction with a clinical pretest   probability assessment model to exclude pulmonary embolism (PE) and deep   venous thrombosis (DVT) in outpatients suspected of PE or DVT. The cut-off   value is 0.5 ug/mL FEU.           2/19/2018 12:42 PM      Component Results     Component Value Ref Range & Units Status    N-Terminal Pro BNP Inpatient 20 0 - 900 pg/mL Final       Reference range shown and results flagged as abnormal are suggested inpatient   cut points for confirming diagnosis if CHF in an acute setting. Establishing a   baseline value for each individual patient is useful for follow-up. An   inpatient or emergency department NT-proPBNP <300 pg/mL effectively rules out   acute CHF, with 99% negative predictive value.  The outpatient non-acute reference range for ruling out CHF is:   0-125 pg/mL (age 18 to less than 75)   0-450 pg/mL (age 75 yrs and older)           2/19/2018 12:41 PM      Narrative     CHEST TWO VIEWS February 19, 2018 12:37 PM    HISTORY: Chest pain.     COMPARISON: 1/24/2017.        Impression     IMPRESSION: Negative.          2/19/2018 12:21 PM      Component Results     Component Value Ref Range & Units Status    Troponin I 0.00 0.00 - 0.10 ug/L Final         2/19/2018  2:51 PM      Component Results     Component Value Ref Range & Units Status    Troponin I 0.00 0.00 - 0.10 ug/L Final                Clinical Quality Measure: Blood Pressure Screening     Your blood pressure was checked while you were in the emergency department today. The last reading we obtained was  BP: 152/77 . Please read the guidelines below about what these numbers mean and what you should do about them.  If your systolic blood pressure (the top number) is less than 120 and your diastolic blood pressure (the bottom number) is less than 80, then your blood pressure  is normal. There is nothing more that you need to do about it.  If your systolic blood pressure (the top number) is 120-139 or your diastolic blood pressure (the bottom number) is 80-89, your blood pressure may be higher than it should be. You should have your blood pressure rechecked within a year by a primary care provider.  If your systolic blood pressure (the top number) is 140 or greater or your diastolic blood pressure (the bottom number) is 90 or greater, you may have high blood pressure. High blood pressure is treatable, but if left untreated over time it can put you at risk for heart attack, stroke, or kidney failure. You should have your blood pressure rechecked by a primary care provider within the next 4 weeks.  If your provider in the emergency department today gave you specific instructions to follow-up with your doctor or provider even sooner than that, you should follow that instruction and not wait for up to 4 weeks for your follow-up visit.        Thank you for choosing [facility]       Thank you for choosing [facility] for your care. Our goal is always to provide you with excellent care. Hearing back from our patients is one way we can continue to improve our services. Please take a few minutes to complete the written survey that you may receive in the mail after you visit with us. Thank you!        [employer] Information     [employer] gives you secure access to your electronic health record. If you see a primary care provider, you can also send messages to your care team and make appointments. If you have questions, please call your primary care clinic.  If you do not have a primary care provider, please call [phone] and they will assist you.        Care EveryWhere ID     This is your Care EveryWhere ID. This could be used by other organizations to access your [facility] medical records  [id number]        Equal Access to Services     CRISTIAN LYNN AH: deny [place],  dianna loco ah. So [facility] [phone].    ATENCIÓN: Si habla janethañol, tiene a alejandre disposición servicios gratuitos de asistencia lingüística. Llame al [phone].    We comply with applicable federal civil rights laws and [place] laws. We do not discriminate on the basis of race, color, national origin, age, disability, sex, sexual orientation, or gender identity.            After Visit Summary       This is your record. Keep this with you and show to your community pharmacist(s) and doctor(s) at your next visit.

## 2024-08-14 NOTE — OB RN PATIENT PROFILE - NSICDXPASTSURGICALHX_GEN_ALL_CORE_FT
Hx of abdominal nec fasc with bowel resection and ostomy. Also has suprapubic catheter exchanged 3/24 at [facility]. Initially required placement due to abdominal necrotizing fascitis. Which also required skin graft of R medial thigh, R gluteal cleft, R labia.   - Ostomy care and wound care education Hx of abdominal nec fasc with bowel resection and ostomy. Also has suprapubic catheter exchanged 3/24 at [facility]. Initially required placement due to abdominal necrotizing fascitis. Which also required skin graft of R medial thigh, R gluteal cleft, R labia.   - Ostomy care and wound care education provided PAST SURGICAL HISTORY:  No significant past surgical history

## 2024-08-14 NOTE — OB PROVIDER TRIAGE NOTE - NSOBPROVIDERNOTE_OBGYN_ALL_OB_FT
35yo  siup at 33 2/7weeks gmda2 on glyburide 2.5mg daily    +PTC r/o PTL cervix 1cm/soft    cultures gbbs/gc/chlam/urine    UA/cbc/cmp    fs    NS bolus 1L

## 2024-08-14 NOTE — OB PROVIDER H&P - HISTORY OF PRESENT ILLNESS
OB PA H&P    35yo  33 weeks 2 days GDMA2 RICCARDO  from first trimester sono 8 weeks on  LMP 23 came in c/o ctxs pain since 2am, now in  labor, endorsed from previous shift. at 7am patient was 1cm, repeat sve 905am 3/90/-1/vtx/int. Patient denies VB, LOF. Reports fetal movement.    pnc dr poole   gdma2 on glyburide 2.5mg daily fasting fs <100, PP 140s  patient is SMA carrier, FOB alpha thalassemia silent carrier, amniocentesis done on  showed normal male  patient had positive urine culture on     GynHx:  PCOS was on metformin 500 mg daily prior to pregnancy, irregular menses  Reports possible STD and she received antibiotic treatment?  Denies fibroids, abnormal PAP  PMHx: denies  psx denies  Meds: PNV, melatonin  vitamin D  vitamin B12  Glyburide 2.5 mg tablets once daily  Allergies: no known allergies

## 2024-08-14 NOTE — OB PROVIDER TRIAGE NOTE - HISTORY OF PRESENT ILLNESS
35yo  siup at 33 2/7wks came in c/o ctxs pain since 2am denies vaginal intercourse    denies LOF/vag bleeding    reports +FM    gdma2 on glyburide 2.5mg daily    pnc dr poole    OBHx: denies   GynHx: Reports possible STD and she received antibiotic treatment. Denies fibroids, cysts, abnormal PAP  PMHx: PCOS  Meds: PNV   vitamin D  vitamin B12  Glyburide 2.5 mg tablets once daily  PSHx: denies   Allergies: no known allergies

## 2024-08-14 NOTE — OB PROVIDER LABOR PROGRESS NOTE - NS_SUBJECTIVE/OBJECTIVE_OBGYN_ALL_OB_FT
pt seen at bedside, just returned from sono  sono bpp 8/8 [place] 7.4 anterior/vtx/ efw 2352    pt reports to feel some back pain  denies any vb, or lof    abdomen nontender  back no cvat

## 2024-08-14 NOTE — OB PROVIDER LABOR PROGRESS NOTE - NS_SUBJECTIVE/OBJECTIVE_OBGYN_ALL_OB_FT
Insulin drip started at 1.5 units per the protocol  patient reports mild contraction pain, better than before  labile BPs noted dx GHTN   p/c0.1   bp 131/78, afebrile HR 82  cat I  toco occassional       GDMA2, GHTN PTL with uncontrolled fingerstick s/p beta x1, stable  continue current mngt  pain mngt per patient request  amp  dw Dr. Petersen [place] attending

## 2024-08-14 NOTE — OB PROVIDER H&P - GRAVIDA, OB PROFILE
Pt presents to the ER with complaints of worsening SOB, leg swelling and leg redness since last night. Pt also verbalizes having a \"weird headache\", and states she does not get headaches, but is worried as she had a stroke 4 years ago.     Hx CHF.    1

## 2024-08-14 NOTE — OB PROVIDER TRIAGE NOTE - NSHPPHYSICALEXAM_GEN_ALL_CORE
Vital Signs Last 24 Hrs  T(C): 36.9 (14 Aug 2024 06:39), Max: 36.9 (14 Aug 2024 06:39)  T(F): 98.4 (14 Aug 2024 06:39), Max: 98.4 (14 Aug 2024 06:39)  HR: 77 (14 Aug 2024 06:39) (73 - 77)  BP: 137/93 (14 Aug 2024 06:39) (132/87 - 137/93)  BP(mean): --  RR: 18 (14 Aug 2024 06:39) (18 - 18)  SpO2: 98% (14 Aug 2024 06:39) (98% - 98%)    Parameters below as of 14 Aug 2024 06:39  Patient On (Oxygen Delivery Method): room air    nst reactive +accels moderate variability    toco q irreg    abd gravid soft no rigidity no guarding +ctxs palpated    speculum no evidence of LOF/vag bleeding cultures obtained    ve: 1/60/-2/vtx/int    extrem no edema b/l

## 2024-08-14 NOTE — OB PROVIDER LABOR PROGRESS NOTE - NS_SUBJECTIVE/OBJECTIVE_OBGYN_ALL_OB_FT
fingersticks reviewed with Dr. Johnson [place] attending are the following  196, 189, 167  s/p 2L NS bolus  will start insulin drip  reviewed policy with nurse  repeat fs 2hrs  clear diet  nst baseline 150, moderate variability, periods of prolonged accels  toco irritability  sve 3/90/-2/vtx/int  continuous monitoring   stadol vs morphine for pain fingersticks reviewed with Dr. Johnson [place] attending are the following  196, 189, 167  s/p 2L NS bolus  will start insulin drip  reviewed policy with nurse  repeat fs q1hrs while on insulin drip  clear diet  nst baseline 150, moderate variability, periods of prolonged accels  toco irritability  sve 3/90/-2/vtx/int  continuous monitoring   stadol vs morphine for pain

## 2024-08-14 NOTE — OB PROVIDER H&P - ASSESSMENT
a/p 33y/o  33 weeks 2 days GDMA2 in  labor, stable  s/p beta x1  fs q2  expt mngt  vaginal cultures sent  amp for unk gbs and   mfm consult  clear diet  venodynes  pain mngt upon patient request  Dr. Alex chin attending

## 2024-08-14 NOTE — OB PROVIDER H&P - PROBLEM SELECTOR PLAN 1
a/p 35y/o  33 weeks 2 days GDMA2 in  labor, stable  s/p beta x1  fs q2  expt mngt  vaginal cultures sent  amp for unk gbs and   mfm consult  clear diet  venodynes  pain mngt upon patient request  Dr. Alex chin attending

## 2024-08-14 NOTE — OB RN PATIENT PROFILE - FALL HARM RISK - UNIVERSAL INTERVENTIONS
Bed in lowest position, wheels locked, appropriate side rails in place/Call bell, personal items and telephone in reach/Instruct patient to call for assistance before getting out of bed or chair/Non-slip footwear when patient is out of bed/[place] to call system/Physically safe environment - no spills, clutter or unnecessary equipment/Purposeful Proactive Rounding/Room/bathroom lighting operational, light cord in reach

## 2024-08-15 LAB
A1C WITH ESTIMATED AVERAGE GLUCOSE RESULT: 7.6 % — HIGH (ref 4–5.6)
C TRACH RRNA SPEC QL NAA+PROBE: SIGNIFICANT CHANGE UP
CULTURE RESULTS: SIGNIFICANT CHANGE UP
ESTIMATED AVERAGE GLUCOSE: 171 MG/DL — HIGH (ref 68–114)
GLUCOSE BLDC GLUCOMTR-MCNC: 128 MG/DL — HIGH (ref 70–99)
GLUCOSE BLDC GLUCOMTR-MCNC: 130 MG/DL — HIGH (ref 70–99)
GLUCOSE BLDC GLUCOMTR-MCNC: 132 MG/DL — HIGH (ref 70–99)
GLUCOSE BLDC GLUCOMTR-MCNC: 176 MG/DL — HIGH (ref 70–99)
GLUCOSE BLDC GLUCOMTR-MCNC: 190 MG/DL — HIGH (ref 70–99)
GLUCOSE BLDC GLUCOMTR-MCNC: 246 MG/DL — HIGH (ref 70–99)
GLUCOSE BLDC GLUCOMTR-MCNC: 248 MG/DL — HIGH (ref 70–99)
GLUCOSE BLDC GLUCOMTR-MCNC: 263 MG/DL — HIGH (ref 70–99)
GLUCOSE BLDC GLUCOMTR-MCNC: 269 MG/DL — HIGH (ref 70–99)
GROUP B BETA STREP DNA (PCR): DETECTED
N GONORRHOEA RRNA SPEC QL NAA+PROBE: SIGNIFICANT CHANGE UP
SOURCE GROUP B STREP: SIGNIFICANT CHANGE UP
SPECIMEN SOURCE: SIGNIFICANT CHANGE UP
SPECIMEN SOURCE: SIGNIFICANT CHANGE UP

## 2024-08-15 RX ORDER — DEXTROSE 15 G/33 G
25 GEL IN PACKET (GRAM) ORAL ONCE
Refills: 0 | Status: DISCONTINUED | OUTPATIENT
Start: 2024-08-15 | End: 2024-08-15

## 2024-08-15 RX ORDER — GLUCAGON INJECTION, SOLUTION 1 MG/.2ML
1 INJECTION, SOLUTION SUBCUTANEOUS ONCE
Refills: 0 | Status: DISCONTINUED | OUTPATIENT
Start: 2024-08-15 | End: 2024-08-22

## 2024-08-15 RX ORDER — INSULIN GLARGINE 100 [IU]/ML
28 INJECTION, SOLUTION SUBCUTANEOUS AT BEDTIME
Refills: 0 | Status: DISCONTINUED | OUTPATIENT
Start: 2024-08-15 | End: 2024-08-16

## 2024-08-15 RX ORDER — DEXTROSE 15 G/33 G
12.5 GEL IN PACKET (GRAM) ORAL ONCE
Refills: 0 | Status: DISCONTINUED | OUTPATIENT
Start: 2024-08-15 | End: 2024-08-22

## 2024-08-15 RX ORDER — DEXTROSE 15 G/33 G
15 GEL IN PACKET (GRAM) ORAL ONCE
Refills: 0 | Status: DISCONTINUED | OUTPATIENT
Start: 2024-08-15 | End: 2024-08-22

## 2024-08-15 RX ORDER — FLUCONAZOLE 150 MG/1
150 TABLET ORAL ONCE
Refills: 0 | Status: COMPLETED | OUTPATIENT
Start: 2024-08-15 | End: 2024-08-15

## 2024-08-15 RX ORDER — DEXTROSE 15 G/33 G
25 GEL IN PACKET (GRAM) ORAL ONCE
Refills: 0 | Status: DISCONTINUED | OUTPATIENT
Start: 2024-08-15 | End: 2024-08-22

## 2024-08-15 RX ADMIN — NIFEDIPINE 10 MILLIGRAM(S): 60 TABLET, FILM COATED, EXTENDED RELEASE ORAL at 16:30

## 2024-08-15 RX ADMIN — Medication 100 GRAM(S): at 00:58

## 2024-08-15 RX ADMIN — FAMOTIDINE 20 MILLIGRAM(S): 10 INJECTION INTRAVENOUS at 06:27

## 2024-08-15 RX ADMIN — INSULIN GLARGINE 28 UNIT(S): 100 INJECTION, SOLUTION SUBCUTANEOUS at 21:58

## 2024-08-15 RX ADMIN — Medication 4: at 10:13

## 2024-08-15 RX ADMIN — NIFEDIPINE 10 MILLIGRAM(S): 60 TABLET, FILM COATED, EXTENDED RELEASE ORAL at 10:43

## 2024-08-15 RX ADMIN — NIFEDIPINE 10 MILLIGRAM(S): 60 TABLET, FILM COATED, EXTENDED RELEASE ORAL at 04:11

## 2024-08-15 RX ADMIN — Medication 12 MILLIGRAM(S): at 09:30

## 2024-08-15 RX ADMIN — FLUCONAZOLE 150 MILLIGRAM(S): 150 TABLET ORAL at 22:19

## 2024-08-15 RX ADMIN — SODIUM CITRATE AND CITRIC ACID MONOHYDRATE 15 MILLILITER(S): 334; 500 SOLUTION ORAL at 00:26

## 2024-08-15 RX ADMIN — FAMOTIDINE 20 MILLIGRAM(S): 10 INJECTION INTRAVENOUS at 21:59

## 2024-08-15 RX ADMIN — Medication 2: at 13:55

## 2024-08-15 RX ADMIN — Medication 100 GRAM(S): at 06:26

## 2024-08-15 RX ADMIN — Medication 6: at 18:03

## 2024-08-15 RX ADMIN — Medication 4 UNIT(S): at 22:20

## 2024-08-15 RX ADMIN — NIFEDIPINE 10 MILLIGRAM(S): 60 TABLET, FILM COATED, EXTENDED RELEASE ORAL at 21:59

## 2024-08-15 NOTE — CHART NOTE - NSCHARTNOTEFT_GEN_A_CORE
Patient plan discussed with MFM Dr Knutson.  Due to elevated fingersticks, plan is to increase lantus to 28 units tonight.  Plan to give 4 units additional for coverage at this time.  Add a short acting insulin prior to each meal in addition to sliding scale coverage.  Monitor closely at this time for tighter glycemic control.

## 2024-08-15 NOTE — PROGRESS NOTE ADULT - SUBJECTIVE AND OBJECTIVE BOX
Patient seen resting comfortably.  No complaints.  States contraction pain is rare at this time and have spaced out significantly.  Reports no vaginal bleeding, leakage of fluid, decreased fetal movement or any other concerns.      FHT: Baseline 140, moderate variability +accels, no decels  [place]: Irritability, no regular contractions  VE: Deferred - unchanged at 3/90/-1 from previous exams x 3                          12.5   10.05 )-----------( 146      ( 14 Aug 2024 07:30 )             37.4     POCT Blood Glucose.: 263 mg/dL (08.15.24 @ 18:00)        A/P: Patient is a 34 year old  at 33w2d admitted for  labor.  GDMA2 and gHTN.   Patient now s/p betamethasone x 2, magnesium x 24 hours.    -Plan to transfer to antepartum unit  -Lantus 20U QHS and sliding scale for glycemic control  -NST q shift  -Labs in AM  -Continue close monitoring    Discussed with Dr Moraes

## 2024-08-15 NOTE — OB PROVIDER LABOR PROGRESS NOTE - NS_SUBJECTIVE/OBJECTIVE_OBGYN_ALL_OB_FT
pt states she is comfortable. Feels CTX q 10-20 min. No LOF/VB. +FM    123/63  87 bpm  O2 sat 97% RA    VE: deferred

## 2024-08-16 DIAGNOSIS — O99.119 OTHER DISEASES OF THE BLOOD AND BLOOD-FORMING ORGANS AND CERTAIN DISORDERS INVOLVING THE IMMUNE MECHANISM COMPLICATING PREGNANCY, UNSPECIFIED TRIMESTER: ICD-10-CM

## 2024-08-16 LAB
GLUCOSE BLDC GLUCOMTR-MCNC: 127 MG/DL — HIGH (ref 70–99)
GLUCOSE BLDC GLUCOMTR-MCNC: 140 MG/DL — HIGH (ref 70–99)
GLUCOSE BLDC GLUCOMTR-MCNC: 165 MG/DL — HIGH (ref 70–99)
GLUCOSE BLDC GLUCOMTR-MCNC: 84 MG/DL — SIGNIFICANT CHANGE UP (ref 70–99)
GLUCOSE BLDC GLUCOMTR-MCNC: 95 MG/DL — SIGNIFICANT CHANGE UP (ref 70–99)

## 2024-08-16 RX ORDER — INSULIN GLARGINE 100 [IU]/ML
35 INJECTION, SOLUTION SUBCUTANEOUS AT BEDTIME
Refills: 0 | Status: DISCONTINUED | OUTPATIENT
Start: 2024-08-16 | End: 2024-08-19

## 2024-08-16 RX ADMIN — Medication 10 UNIT(S): at 11:30

## 2024-08-16 RX ADMIN — NIFEDIPINE 10 MILLIGRAM(S): 60 TABLET, FILM COATED, EXTENDED RELEASE ORAL at 11:08

## 2024-08-16 RX ADMIN — Medication 10 UNIT(S): at 16:44

## 2024-08-16 RX ADMIN — INSULIN GLARGINE 35 UNIT(S): 100 INJECTION, SOLUTION SUBCUTANEOUS at 23:06

## 2024-08-16 RX ADMIN — FAMOTIDINE 20 MILLIGRAM(S): 10 INJECTION INTRAVENOUS at 06:03

## 2024-08-16 RX ADMIN — Medication 10 UNIT(S): at 07:49

## 2024-08-16 RX ADMIN — FAMOTIDINE 20 MILLIGRAM(S): 10 INJECTION INTRAVENOUS at 17:30

## 2024-08-16 RX ADMIN — NIFEDIPINE 10 MILLIGRAM(S): 60 TABLET, FILM COATED, EXTENDED RELEASE ORAL at 04:24

## 2024-08-16 RX ADMIN — Medication 2: at 11:44

## 2024-08-16 RX ADMIN — NIFEDIPINE 10 MILLIGRAM(S): 60 TABLET, FILM COATED, EXTENDED RELEASE ORAL at 16:43

## 2024-08-16 NOTE — CHART NOTE - NSCHARTNOTEFT_GEN_A_CORE
Pt discussed with for medication recommendations and further plan of care. Goal is get patient on an appropriate insulin regimen to control GDMA2 that discharge will be considered     NST @ 12:23  Baseline: 150  Variability: moderate   Accelerations: present  Decelerations: occasional variable decels   TOCO: 2 contractions     -Patient s/p betamethasone x 2, magnesium x 24 hours, s/p nifedipine for 48 hours   -NST  for 1 hour q12 hrs  -Continue close monitoring  - CBC in am     [facility] recommends:  - Increase Lantus 28U QHS  to 35 units QHS, Consider increase to 40 units if no improvement    - Continue 10 units of humalog with meal, consider increase to 12 units if post prandial blood sugars are still elevated   - sliding scale for glycemic control  - If glycemic control is achieved and patient is stable for discharge, pt will follow up weekly for  testing until delivery     D/W  Dr Moraes.  D/W [facility] Dr. Delaney, Late entry note call with MFM occurred at 13:42     Pt discussed with for medication recommendations and further plan of care. Goal is get patient on an appropriate insulin regimen to control GDMA2 that discharge will be considered     NST @ 12:23  Baseline: 150  Variability: moderate   Accelerations: present  Decelerations: occasional variable decels   TOCO: 2 contractions     -Patient s/p betamethasone x 2, magnesium x 24 hours, s/p nifedipine for 48 hours   -NST  for 1 hour q12 hrs  -Continue close monitoring  - CBC in am     [facility] recommends:  - Increase Lantus 28U QHS  to 35 units QHS, Consider increase to 40 units if no improvement    - Continue 10 units of humalog with meal, consider increase to 12 units if post prandial blood sugars are still elevated   - sliding scale for glycemic control  - If glycemic control is achieved and patient is stable for discharge, pt will follow up weekly for  testing until delivery     D/W  Dr Moraes.  D/W M Dr. Delaney,

## 2024-08-16 NOTE — PROGRESS NOTE ADULT - SUBJECTIVE AND OBJECTIVE BOX
OB PA Progress Note  Hospital day #3 PTL    Patient seen resting comfortably at bedside with MODE Eduardo.  Denies contraction pain.  Reports no vaginal bleeding, leakage of fluid, decreased fetal movement or any other concerns.  Notes some back pain. Positive fetal movement.                ICU Vital Signs Last 24 Hrs  T(C): 36.6 (16 Aug 2024 14:00), Max: 36.8 (16 Aug 2024 01:10)  T(F): 97.8 (16 Aug 2024 14:00), Max: 98.3 (16 Aug 2024 01:10)  HR: 73 (16 Aug 2024 14:00) (71 - 92)  BP: 104/62 (16 Aug 2024 14:00) (104/62 - 116/72)  BP(mean): --  ABP: --  ABP(mean): --  RR: 17 (16 Aug 2024 14:00) (17 - 18)  SpO2: 96% (16 Aug 2024 14:00) (95% - 96%)    O2 Parameters below as of 16 Aug 2024 14:00  Patient On (Oxygen Delivery Method): room air    CAPILLARY BLOOD GLUCOSE      POCT Blood Glucose.: 165 mg/dL (16 Aug 2024 11:31)  POCT Blood Glucose.: 127 mg/dL (16 Aug 2024 07:36)  POCT Blood Glucose.: 176 mg/dL (15 Aug 2024 21:39)  POCT Blood Glucose.: 263 mg/dL (15 Aug 2024 18:00)    Culture - Urine (08.14.24 @ 07:30)    Specimen Source: Clean Catch Clean Catch (Midstream)   Culture Results:   <10,000 CFU/mL Normal Urogenital Taina    Group B Strep Detection by PCR (08.14.24 @ 07:30)   Source Group B Strep: Vag/Rectal  Group B Beta Strep DNA (PCR): Detected: The results of this test should be interpreted with consideration of   clinical context.     Exam:   General: alert and oriented x 2  Abdomen-gravid, soft  Back: No CVA tenderness bilaterally   Extremities: nontender bilaterally

## 2024-08-17 PROBLEM — E28.2 POLYCYSTIC OVARIAN SYNDROME: Chronic | Status: ACTIVE | Noted: 2024-08-09

## 2024-08-17 LAB
BASOPHILS # BLD AUTO: 0.02 K/UL — SIGNIFICANT CHANGE UP (ref 0–0.2)
BASOPHILS NFR BLD AUTO: 0.2 % — SIGNIFICANT CHANGE UP (ref 0–2)
EOSINOPHIL # BLD AUTO: 0.01 K/UL — SIGNIFICANT CHANGE UP (ref 0–0.5)
EOSINOPHIL NFR BLD AUTO: 0.1 % — SIGNIFICANT CHANGE UP (ref 0–6)
GLUCOSE BLDC GLUCOMTR-MCNC: 101 MG/DL — HIGH (ref 70–99)
GLUCOSE BLDC GLUCOMTR-MCNC: 140 MG/DL — HIGH (ref 70–99)
GLUCOSE BLDC GLUCOMTR-MCNC: 151 MG/DL — HIGH (ref 70–99)
GLUCOSE BLDC GLUCOMTR-MCNC: 58 MG/DL — LOW (ref 70–99)
GLUCOSE BLDC GLUCOMTR-MCNC: 73 MG/DL — SIGNIFICANT CHANGE UP (ref 70–99)
GLUCOSE BLDC GLUCOMTR-MCNC: 74 MG/DL — SIGNIFICANT CHANGE UP (ref 70–99)
GLUCOSE BLDC GLUCOMTR-MCNC: 91 MG/DL — SIGNIFICANT CHANGE UP (ref 70–99)
GLUCOSE BLDC GLUCOMTR-MCNC: 97 MG/DL — SIGNIFICANT CHANGE UP (ref 70–99)
HCT VFR BLD CALC: 34.1 % — LOW (ref 34.5–45)
HGB BLD-MCNC: 11.4 G/DL — LOW (ref 11.5–15.5)
IMM GRANULOCYTES NFR BLD AUTO: 0.8 % — SIGNIFICANT CHANGE UP (ref 0–0.9)
LYMPHOCYTES # BLD AUTO: 2.55 K/UL — SIGNIFICANT CHANGE UP (ref 1–3.3)
LYMPHOCYTES # BLD AUTO: 24.5 % — SIGNIFICANT CHANGE UP (ref 13–44)
MCHC RBC-ENTMCNC: 27 PG — SIGNIFICANT CHANGE UP (ref 27–34)
MCHC RBC-ENTMCNC: 33.4 GM/DL — SIGNIFICANT CHANGE UP (ref 32–36)
MCV RBC AUTO: 80.8 FL — SIGNIFICANT CHANGE UP (ref 80–100)
MONOCYTES # BLD AUTO: 1.26 K/UL — HIGH (ref 0–0.9)
MONOCYTES NFR BLD AUTO: 12.1 % — SIGNIFICANT CHANGE UP (ref 2–14)
NEUTROPHILS # BLD AUTO: 6.48 K/UL — SIGNIFICANT CHANGE UP (ref 1.8–7.4)
NEUTROPHILS NFR BLD AUTO: 62.3 % — SIGNIFICANT CHANGE UP (ref 43–77)
NRBC # BLD: 0 /100 WBCS — SIGNIFICANT CHANGE UP (ref 0–0)
PLATELET # BLD AUTO: 164 K/UL — SIGNIFICANT CHANGE UP (ref 150–400)
RBC # BLD: 4.22 M/UL — SIGNIFICANT CHANGE UP (ref 3.8–5.2)
RBC # FLD: 12.3 % — SIGNIFICANT CHANGE UP (ref 10.3–14.5)
WBC # BLD: 10.4 K/UL — SIGNIFICANT CHANGE UP (ref 3.8–10.5)
WBC # FLD AUTO: 10.4 K/UL — SIGNIFICANT CHANGE UP (ref 3.8–10.5)

## 2024-08-17 PROCEDURE — 76819 FETAL BIOPHYS PROFIL W/O NST: CPT | Mod: 26

## 2024-08-17 RX ADMIN — INSULIN GLARGINE 35 UNIT(S): 100 INJECTION, SOLUTION SUBCUTANEOUS at 22:21

## 2024-08-17 RX ADMIN — ACETAMINOPHEN 650 MILLIGRAM(S): 325 TABLET ORAL at 23:53

## 2024-08-17 RX ADMIN — Medication 10 UNIT(S): at 16:31

## 2024-08-17 RX ADMIN — FAMOTIDINE 20 MILLIGRAM(S): 10 INJECTION INTRAVENOUS at 05:30

## 2024-08-17 RX ADMIN — FAMOTIDINE 20 MILLIGRAM(S): 10 INJECTION INTRAVENOUS at 18:08

## 2024-08-17 RX ADMIN — Medication 10 UNIT(S): at 07:45

## 2024-08-17 NOTE — CHART NOTE - NSCHARTNOTEFT_GEN_A_CORE
delayed entry note  CAPILLARY BLOOD GLUCOSE      POCT Blood Glucose.: 101 mg/dL (17 Aug 2024 21:28)  POCT Blood Glucose.: 97 mg/dL (17 Aug 2024 19:45)  POCT Blood Glucose.: 74 mg/dL (17 Aug 2024 16:21)  POCT Blood Glucose.: 140 mg/dL (17 Aug 2024 13:13)  POCT Blood Glucose.: 151 mg/dL (17 Aug 2024 12:38)  POCT Blood Glucose.: 58 mg/dL (17 Aug 2024 11:51)  POCT Blood Glucose.: 73 mg/dL (17 Aug 2024 07:56)  POCT Blood Glucose.: 91 mg/dL (17 Aug 2024 06:11)    fingerstick reviewed with DR Peace, Dr Shea during MFM rounds  will decrease humalog delayed entry note  CAPILLARY BLOOD GLUCOSE      POCT Blood Glucose.: 101 mg/dL (17 Aug 2024 21:28)  POCT Blood Glucose.: 97 mg/dL (17 Aug 2024 19:45)  POCT Blood Glucose.: 74 mg/dL (17 Aug 2024 16:21)  POCT Blood Glucose.: 140 mg/dL (17 Aug 2024 13:13)  POCT Blood Glucose.: 151 mg/dL (17 Aug 2024 12:38)  POCT Blood Glucose.: 58 mg/dL (17 Aug 2024 11:51)  POCT Blood Glucose.: 73 mg/dL (17 Aug 2024 07:56)  POCT Blood Glucose.: 91 mg/dL (17 Aug 2024 06:11)    fingerstick reviewed with DR Peace, Dr Shea during MFM rounds  will decrease humalog to 8units with meals and maintain lantus 35units  will continue fingerstick fasting and post prandial  will continue nst bid

## 2024-08-17 NOTE — CHART NOTE - NSCHARTNOTEFT_GEN_A_CORE
OB PA Focused Note    Patient offers no complaints at this time  nst cat I  toco no contractions  nst reviewed by Dr. [place] attending

## 2024-08-17 NOTE — PROGRESS NOTE ADULT - SUBJECTIVE AND OBJECTIVE BOX
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- now in pt on insulin due to erratic glucose results w betamethasone     bpp today 8/8 [place]: 12    NST reactive    toco q occ    pt doing well reports +Fm    deneis LOF/vag bleeding    pt is updated w her sonogram results today -reassuring    fasting glucose: 91    1hr after bfast 73 (pt had 10u of ADMELOG    RN did glucose before lunch: 58 the ADMELOG held- 30min after lunch: 151; 1hr after lunch glucose 140      Vital Signs Last 24 Hrs  T(C): 36.4 (17 Aug 2024 10:35), Max: 36.7 (16 Aug 2024 18:30)  T(F): 97.5 (17 Aug 2024 10:35), Max: 98.1 (16 Aug 2024 18:30)  HR: 74 (17 Aug 2024 10:35) (62 - 75)  BP: 107/72 (17 Aug 2024 10:35) (104/62 - 122/74)  BP(mean): --  RR: 17 (17 Aug 2024 10:35) (16 - 17)  SpO2: 98% (17 Aug 2024 10:35) (96% - 99%)    Parameters below as of 17 Aug 2024 10:35  Patient On (Oxygen Delivery Method): room air    abd gravid soft NT no guarding no rebound    ve deferred    extrem no edema b/l

## 2024-08-18 LAB
GLUCOSE BLDC GLUCOMTR-MCNC: 107 MG/DL — HIGH (ref 70–99)
GLUCOSE BLDC GLUCOMTR-MCNC: 123 MG/DL — HIGH (ref 70–99)
GLUCOSE BLDC GLUCOMTR-MCNC: 124 MG/DL — HIGH (ref 70–99)
GLUCOSE BLDC GLUCOMTR-MCNC: 160 MG/DL — HIGH (ref 70–99)
GLUCOSE BLDC GLUCOMTR-MCNC: 165 MG/DL — HIGH (ref 70–99)
GLUCOSE BLDC GLUCOMTR-MCNC: 61 MG/DL — LOW (ref 70–99)
GLUCOSE BLDC GLUCOMTR-MCNC: 70 MG/DL — SIGNIFICANT CHANGE UP (ref 70–99)

## 2024-08-18 RX ORDER — AMPICILLIN TRIHYDRATE 500 MG
1 CAPSULE ORAL EVERY 4 HOURS
Refills: 0 | Status: DISCONTINUED | OUTPATIENT
Start: 2024-08-18 | End: 2024-08-18

## 2024-08-18 RX ORDER — ACETAMINOPHEN 325 MG/1
1000 TABLET ORAL ONCE
Refills: 0 | Status: COMPLETED | OUTPATIENT
Start: 2024-08-18 | End: 2024-08-18

## 2024-08-18 RX ORDER — SODIUM CHLORIDE 9 MG/ML
1000 INJECTION INTRAMUSCULAR; INTRAVENOUS; SUBCUTANEOUS
Refills: 0 | Status: DISCONTINUED | OUTPATIENT
Start: 2024-08-18 | End: 2024-08-18

## 2024-08-18 RX ORDER — SODIUM CITRATE AND CITRIC ACID MONOHYDRATE 334; 500 MG/5ML; MG/5ML
15 SOLUTION ORAL EVERY 6 HOURS
Refills: 0 | Status: DISCONTINUED | OUTPATIENT
Start: 2024-08-18 | End: 2024-08-18

## 2024-08-18 RX ORDER — CHLORHEXIDINE GLUCONATE 40 MG/ML
1 SOLUTION TOPICAL DAILY
Refills: 0 | Status: DISCONTINUED | OUTPATIENT
Start: 2024-08-18 | End: 2024-08-18

## 2024-08-18 RX ORDER — AMPICILLIN TRIHYDRATE 500 MG
2 CAPSULE ORAL ONCE
Refills: 0 | Status: COMPLETED | OUTPATIENT
Start: 2024-08-18 | End: 2024-08-18

## 2024-08-18 RX ADMIN — Medication 2: at 11:49

## 2024-08-18 RX ADMIN — ACETAMINOPHEN 650 MILLIGRAM(S): 325 TABLET ORAL at 22:44

## 2024-08-18 RX ADMIN — Medication 2: at 19:27

## 2024-08-18 RX ADMIN — SODIUM CHLORIDE 125 MILLILITER(S): 9 INJECTION INTRAMUSCULAR; INTRAVENOUS; SUBCUTANEOUS at 15:48

## 2024-08-18 RX ADMIN — Medication 8 UNIT(S): at 11:50

## 2024-08-18 RX ADMIN — INSULIN GLARGINE 35 UNIT(S): 100 INJECTION, SOLUTION SUBCUTANEOUS at 21:56

## 2024-08-18 RX ADMIN — Medication 4 MILLIGRAM(S): at 04:58

## 2024-08-18 RX ADMIN — Medication 108 GRAM(S): at 20:01

## 2024-08-18 RX ADMIN — Medication 200 GRAM(S): at 15:51

## 2024-08-18 RX ADMIN — ACETAMINOPHEN 400 MILLIGRAM(S): 325 TABLET ORAL at 23:43

## 2024-08-18 NOTE — CHART NOTE - NSCHARTNOTEFT_GEN_A_CORE
Pt seen for NST     Baseline: 150  Variability: moderate   Accelerations: present   Decelerations: occasional variable decels   TOCO: q 5 minutes     Pt has complaints of sacral pain at this time, 7/10. She thinks it is related to the bed that she is sleeping on. Pt notes +FM. denies pelvic pressure, VB, LOF  SVE: 3/90/-1, unchanged form 8/14    Plan  - Continue current care  - 500ml bolus given, contractions spaced out s/p blous   - Tylenol and heating pad for Back pain

## 2024-08-18 NOTE — OB PROVIDER LABOR PROGRESS NOTE - NS_SUBJECTIVE/OBJECTIVE_OBGYN_ALL_OB_FT
Patient seen resting comfortably at bedside with Dr. Moraes and team    Fetal tracing reviewed by Dr. Moraes     Correctly positioned toco monitor for adequate reading     Patient offers no complaints at this time and is tolerating morphine 4mg given at 0500

## 2024-08-18 NOTE — OB PROVIDER LABOR PROGRESS NOTE - NS_SUBJECTIVE/OBJECTIVE_OBGYN_ALL_OB_FT
Pt states ctx pain continues rated 3/10, every 5-10 minutes  Vital Signs Last 24 Hrs  T(C): 36.7 (08-18-24 @ 01:30), Max: 36.9 (08-17-24 @ 21:09)  T(F): 98.1 (08-18-24 @ 01:30), Max: 98.4 (08-17-24 @ 21:09)  HR: 60 (08-18-24 @ 01:30) (60 - 74)  BP: 126/80 (08-18-24 @ 01:30) (107/72 - 126/80)  BP(mean): --  RR: 18 (08-18-24 @ 01:30) (15 - 18)  SpO2: 98% (08-18-24 @ 01:30) (96% - 99%)

## 2024-08-18 NOTE — CHART NOTE - NSCHARTNOTEFT_GEN_A_CORE
Pt seen resting comfortably at the bedside, no new complaints at this time. Pt notes that pain is improved and denies CTX, an LOF. Pt notes +FM and scant bloody vaginal discharge.     NST reviewed   Baseline: 150  Variability: moderate   Accelerations: present   Decelerations: occasional variable decels   TOCO: No contractions    Pt discussed with Dr. Shea and Dr. Peace on [facility] safety huddle. Blood sugars and plan of care discussed     - Pt to be transferred back to Antepartum for continued observation   - NST q 12  hours  - BPP and T&S q 3 days   - Continue with lanatus 35u QHS and Humalog 8u w/ meals. Sliding scale ordered for corrective insulin     Dr. Johnson aware

## 2024-08-18 NOTE — CHART NOTE - NSCHARTNOTEFT_GEN_A_CORE
Pt seen at the bedside with Dr Moraes do to continue complaints of increased back/sacral pain. pt states that pain is severe and she is no longer able to tolerate it     Pt declining pelvic pressure, cramping, or pain. No VB, or LOF, + FM     Exam per Dr Moraes 3.5/100/0    Pt to receive morphine 4mg, pt will be transferred to triage for fetal monitoring while morphine is administered

## 2024-08-18 NOTE — OB PROVIDER LABOR PROGRESS NOTE - NS_SUBJECTIVE/OBJECTIVE_OBGYN_ALL_OB_FT
RN notified PA patient has more bloody show when she went to use the bathroom.   Pt assessed at bedside in antepartum unit, resting in bed states she does not feel any contraction pain. +FM, denies lof.    Dr. Moraes at bedside and examined pt: 4/100/+1      Vital Signs Last 24 Hrs  T(F): 98.5 (08-18-24 @ 14:49), Max: 98.5 (08-18-24 @ 14:49)  HR: 72 (08-18-24 @ 14:49) (60 - 72)  BP: 129/85 (08-18-24 @ 14:49) (103/63 - 129/85)  RR: 18 (08-18-24 @ 14:49) (17 - 18)  SpO2: 97% (08-18-24 @ 14:49) (96% - 98%)

## 2024-08-19 LAB
ALBUMIN SERPL ELPH-MCNC: 2.4 G/DL — LOW (ref 3.5–5)
ALP SERPL-CCNC: 92 U/L — SIGNIFICANT CHANGE UP (ref 40–120)
ALT FLD-CCNC: 39 U/L DA — SIGNIFICANT CHANGE UP (ref 10–60)
ANION GAP SERPL CALC-SCNC: 10 MMOL/L — SIGNIFICANT CHANGE UP (ref 5–17)
ANION GAP SERPL CALC-SCNC: 6 MMOL/L — SIGNIFICANT CHANGE UP (ref 5–17)
APPEARANCE UR: CLEAR — SIGNIFICANT CHANGE UP
APTT BLD: 30.7 SEC — SIGNIFICANT CHANGE UP (ref 24.5–35.6)
AST SERPL-CCNC: 26 U/L — SIGNIFICANT CHANGE UP (ref 10–40)
BASOPHILS # BLD AUTO: 0.02 K/UL — SIGNIFICANT CHANGE UP (ref 0–0.2)
BASOPHILS # BLD AUTO: 0.03 K/UL — SIGNIFICANT CHANGE UP (ref 0–0.2)
BASOPHILS NFR BLD AUTO: 0.1 % — SIGNIFICANT CHANGE UP (ref 0–2)
BASOPHILS NFR BLD AUTO: 0.2 % — SIGNIFICANT CHANGE UP (ref 0–2)
BILIRUB SERPL-MCNC: 0.3 MG/DL — SIGNIFICANT CHANGE UP (ref 0.2–1.2)
BILIRUB UR-MCNC: NEGATIVE — SIGNIFICANT CHANGE UP
BLD GP AB SCN SERPL QL: SIGNIFICANT CHANGE UP
BUN SERPL-MCNC: 13 MG/DL — SIGNIFICANT CHANGE UP (ref 7–18)
BUN SERPL-MCNC: 9 MG/DL — SIGNIFICANT CHANGE UP (ref 7–18)
CALCIUM SERPL-MCNC: 8.6 MG/DL — SIGNIFICANT CHANGE UP (ref 8.4–10.5)
CALCIUM SERPL-MCNC: 9 MG/DL — SIGNIFICANT CHANGE UP (ref 8.4–10.5)
CHLORIDE SERPL-SCNC: 105 MMOL/L — SIGNIFICANT CHANGE UP (ref 96–108)
CHLORIDE SERPL-SCNC: 106 MMOL/L — SIGNIFICANT CHANGE UP (ref 96–108)
CO2 SERPL-SCNC: 22 MMOL/L — SIGNIFICANT CHANGE UP (ref 22–31)
CO2 SERPL-SCNC: 24 MMOL/L — SIGNIFICANT CHANGE UP (ref 22–31)
COLOR SPEC: YELLOW — SIGNIFICANT CHANGE UP
CREAT ?TM UR-MCNC: 92 MG/DL — SIGNIFICANT CHANGE UP
CREAT SERPL-MCNC: 0.66 MG/DL — SIGNIFICANT CHANGE UP (ref 0.5–1.3)
CREAT SERPL-MCNC: 0.68 MG/DL — SIGNIFICANT CHANGE UP (ref 0.5–1.3)
DIFF PNL FLD: NEGATIVE — SIGNIFICANT CHANGE UP
EGFR: 118 ML/MIN/1.73M2 — SIGNIFICANT CHANGE UP
EGFR: 119 ML/MIN/1.73M2 — SIGNIFICANT CHANGE UP
EOSINOPHIL # BLD AUTO: 0 K/UL — SIGNIFICANT CHANGE UP (ref 0–0.5)
EOSINOPHIL # BLD AUTO: 0.02 K/UL — SIGNIFICANT CHANGE UP (ref 0–0.5)
EOSINOPHIL NFR BLD AUTO: 0 % — SIGNIFICANT CHANGE UP (ref 0–6)
EOSINOPHIL NFR BLD AUTO: 0.1 % — SIGNIFICANT CHANGE UP (ref 0–6)
FIBRINOGEN PPP-MCNC: 450 MG/DL — SIGNIFICANT CHANGE UP (ref 200–475)
GLUCOSE BLDC GLUCOMTR-MCNC: 117 MG/DL — HIGH (ref 70–99)
GLUCOSE BLDC GLUCOMTR-MCNC: 122 MG/DL — HIGH (ref 70–99)
GLUCOSE BLDC GLUCOMTR-MCNC: 82 MG/DL — SIGNIFICANT CHANGE UP (ref 70–99)
GLUCOSE BLDC GLUCOMTR-MCNC: 85 MG/DL — SIGNIFICANT CHANGE UP (ref 70–99)
GLUCOSE SERPL-MCNC: 123 MG/DL — HIGH (ref 70–99)
GLUCOSE SERPL-MCNC: 94 MG/DL — SIGNIFICANT CHANGE UP (ref 70–99)
GLUCOSE UR QL: NEGATIVE MG/DL — SIGNIFICANT CHANGE UP
HCT VFR BLD CALC: 34.4 % — LOW (ref 34.5–45)
HCT VFR BLD CALC: 38 % — SIGNIFICANT CHANGE UP (ref 34.5–45)
HGB BLD-MCNC: 11.4 G/DL — LOW (ref 11.5–15.5)
HGB BLD-MCNC: 12.9 G/DL — SIGNIFICANT CHANGE UP (ref 11.5–15.5)
IMM GRANULOCYTES NFR BLD AUTO: 0.3 % — SIGNIFICANT CHANGE UP (ref 0–0.9)
IMM GRANULOCYTES NFR BLD AUTO: 0.5 % — SIGNIFICANT CHANGE UP (ref 0–0.9)
INR BLD: 0.9 RATIO — SIGNIFICANT CHANGE UP (ref 0.85–1.18)
KETONES UR-MCNC: NEGATIVE MG/DL — SIGNIFICANT CHANGE UP
LDH SERPL L TO P-CCNC: 178 U/L — SIGNIFICANT CHANGE UP (ref 120–225)
LEUKOCYTE ESTERASE UR-ACNC: NEGATIVE — SIGNIFICANT CHANGE UP
LYMPHOCYTES # BLD AUTO: 14.6 % — SIGNIFICANT CHANGE UP (ref 13–44)
LYMPHOCYTES # BLD AUTO: 19.1 % — SIGNIFICANT CHANGE UP (ref 13–44)
LYMPHOCYTES # BLD AUTO: 2.12 K/UL — SIGNIFICANT CHANGE UP (ref 1–3.3)
LYMPHOCYTES # BLD AUTO: 2.9 K/UL — SIGNIFICANT CHANGE UP (ref 1–3.3)
MCHC RBC-ENTMCNC: 26.5 PG — LOW (ref 27–34)
MCHC RBC-ENTMCNC: 26.8 PG — LOW (ref 27–34)
MCHC RBC-ENTMCNC: 33.1 GM/DL — SIGNIFICANT CHANGE UP (ref 32–36)
MCHC RBC-ENTMCNC: 33.9 GM/DL — SIGNIFICANT CHANGE UP (ref 32–36)
MCV RBC AUTO: 78.8 FL — LOW (ref 80–100)
MCV RBC AUTO: 80 FL — SIGNIFICANT CHANGE UP (ref 80–100)
MONOCYTES # BLD AUTO: 1.35 K/UL — HIGH (ref 0–0.9)
MONOCYTES # BLD AUTO: 1.49 K/UL — HIGH (ref 0–0.9)
MONOCYTES NFR BLD AUTO: 9.3 % — SIGNIFICANT CHANGE UP (ref 2–14)
MONOCYTES NFR BLD AUTO: 9.8 % — SIGNIFICANT CHANGE UP (ref 2–14)
NEUTROPHILS # BLD AUTO: 10.64 K/UL — HIGH (ref 1.8–7.4)
NEUTROPHILS # BLD AUTO: 11.03 K/UL — HIGH (ref 1.8–7.4)
NEUTROPHILS NFR BLD AUTO: 70.3 % — SIGNIFICANT CHANGE UP (ref 43–77)
NEUTROPHILS NFR BLD AUTO: 75.7 % — SIGNIFICANT CHANGE UP (ref 43–77)
NITRITE UR-MCNC: NEGATIVE — SIGNIFICANT CHANGE UP
NRBC # BLD: 0 /100 WBCS — SIGNIFICANT CHANGE UP (ref 0–0)
NRBC # BLD: 0 /100 WBCS — SIGNIFICANT CHANGE UP (ref 0–0)
PH UR: 7 — SIGNIFICANT CHANGE UP (ref 5–8)
PLATELET # BLD AUTO: 150 K/UL — SIGNIFICANT CHANGE UP (ref 150–400)
PLATELET # BLD AUTO: 167 K/UL — SIGNIFICANT CHANGE UP (ref 150–400)
POTASSIUM SERPL-MCNC: 4.2 MMOL/L — SIGNIFICANT CHANGE UP (ref 3.5–5.3)
POTASSIUM SERPL-MCNC: 4.3 MMOL/L — SIGNIFICANT CHANGE UP (ref 3.5–5.3)
POTASSIUM SERPL-SCNC: 4.2 MMOL/L — SIGNIFICANT CHANGE UP (ref 3.5–5.3)
POTASSIUM SERPL-SCNC: 4.3 MMOL/L — SIGNIFICANT CHANGE UP (ref 3.5–5.3)
PROT ?TM UR-MCNC: 20 MG/DL — HIGH (ref 0–12)
PROT SERPL-MCNC: 7.1 G/DL — SIGNIFICANT CHANGE UP (ref 6–8.3)
PROT UR-MCNC: NEGATIVE MG/DL — SIGNIFICANT CHANGE UP
PROT/CREAT UR-RTO: 0.2 RATIO — SIGNIFICANT CHANGE UP (ref 0–0.2)
PROTHROM AB SERPL-ACNC: 10.3 SEC — SIGNIFICANT CHANGE UP (ref 9.5–13)
RBC # BLD: 4.3 M/UL — SIGNIFICANT CHANGE UP (ref 3.8–5.2)
RBC # BLD: 4.82 M/UL — SIGNIFICANT CHANGE UP (ref 3.8–5.2)
RBC # FLD: 12.3 % — SIGNIFICANT CHANGE UP (ref 10.3–14.5)
RBC # FLD: 12.3 % — SIGNIFICANT CHANGE UP (ref 10.3–14.5)
SODIUM SERPL-SCNC: 136 MMOL/L — SIGNIFICANT CHANGE UP (ref 135–145)
SODIUM SERPL-SCNC: 137 MMOL/L — SIGNIFICANT CHANGE UP (ref 135–145)
SP GR SPEC: 1.02 — SIGNIFICANT CHANGE UP (ref 1–1.03)
T PALLIDUM AB TITR SER: NEGATIVE — SIGNIFICANT CHANGE UP
URATE SERPL-MCNC: 3.6 MG/DL — SIGNIFICANT CHANGE UP (ref 2.5–7)
UROBILINOGEN FLD QL: 1 MG/DL — SIGNIFICANT CHANGE UP (ref 0.2–1)
WBC # BLD: 14.56 K/UL — HIGH (ref 3.8–10.5)
WBC # BLD: 15.15 K/UL — HIGH (ref 3.8–10.5)
WBC # FLD AUTO: 14.56 K/UL — HIGH (ref 3.8–10.5)
WBC # FLD AUTO: 15.15 K/UL — HIGH (ref 3.8–10.5)

## 2024-08-19 PROCEDURE — 88307 TISSUE EXAM BY PATHOLOGIST: CPT | Mod: 26

## 2024-08-19 RX ORDER — CHLORHEXIDINE GLUCONATE 40 MG/ML
1 SOLUTION TOPICAL DAILY
Refills: 0 | Status: DISCONTINUED | OUTPATIENT
Start: 2024-08-19 | End: 2024-08-19

## 2024-08-19 RX ORDER — FERROUS SULFATE 325(65) MG
325 TABLET ORAL DAILY
Refills: 0 | Status: DISCONTINUED | OUTPATIENT
Start: 2024-08-19 | End: 2024-08-22

## 2024-08-19 RX ORDER — ASCORBIC ACID/ASCORBATE SODIUM 500 MG
500 TABLET,CHEWABLE ORAL DAILY
Refills: 0 | Status: DISCONTINUED | OUTPATIENT
Start: 2024-08-19 | End: 2024-08-22

## 2024-08-19 RX ORDER — LIDOCAINE/BENZALKONIUM/ALCOHOL
1 SOLUTION, NON-ORAL TOPICAL ONCE
Refills: 0 | Status: COMPLETED | OUTPATIENT
Start: 2024-08-19 | End: 2024-08-19

## 2024-08-19 RX ORDER — VITAMIN A, ASCORBIC ACID, VITAMIN D, .ALPHA.-TOCOPHEROL, THIAMINE MONONITRATE, RIBOFLAVIN, NIACIN, PYRIDOXINE HYDROCHLORIDE, FOLIC ACID, CYANOCOBALAMIN, CALCIUM, IRON, MAGNESIUM, ZINC, AND COPPER 2700; 70; 400; 30; 1.6; 1.8; 18; 2.5; 1; 12; 100; 65; 25; 25; 2 [IU]/1; MG/1; [IU]/1; [IU]/1; MG/1; MG/1; MG/1; MG/1; MG/1; UG/1; MG/1; MG/1; MG/1; MG/1; MG/1
1 TABLET ORAL DAILY
Refills: 0 | Status: DISCONTINUED | OUTPATIENT
Start: 2024-08-19 | End: 2024-08-22

## 2024-08-19 RX ORDER — AMPICILLIN TRIHYDRATE 500 MG
2 CAPSULE ORAL ONCE
Refills: 0 | Status: COMPLETED | OUTPATIENT
Start: 2024-08-19 | End: 2024-08-19

## 2024-08-19 RX ORDER — FAMOTIDINE 10 MG/ML
20 INJECTION INTRAVENOUS ONCE
Refills: 0 | Status: DISCONTINUED | OUTPATIENT
Start: 2024-08-19 | End: 2024-08-22

## 2024-08-19 RX ORDER — TETANUS TOXOID, REDUCED DIPHTHERIA TOXOID AND ACELLULAR PERTUSSIS VACCINE, ADSORBED 5; 2.5; 8; 8; 2.5 [IU]/.5ML; [IU]/.5ML; UG/.5ML; UG/.5ML; UG/.5ML
0.5 SUSPENSION INTRAMUSCULAR ONCE
Refills: 0 | Status: DISCONTINUED | OUTPATIENT
Start: 2024-08-19 | End: 2024-08-22

## 2024-08-19 RX ORDER — LANOLIN
1 OINTMENT (GRAM) TOPICAL EVERY 6 HOURS
Refills: 0 | Status: DISCONTINUED | OUTPATIENT
Start: 2024-08-19 | End: 2024-08-22

## 2024-08-19 RX ORDER — SODIUM CHLORIDE 9 MG/ML
1000 INJECTION INTRAMUSCULAR; INTRAVENOUS; SUBCUTANEOUS
Refills: 0 | Status: DISCONTINUED | OUTPATIENT
Start: 2024-08-19 | End: 2024-08-19

## 2024-08-19 RX ORDER — OXYTOCIN 10 UNIT/ML
333.33 AMPUL (ML) INJECTION
Qty: 20 | Refills: 0 | Status: DISCONTINUED | OUTPATIENT
Start: 2024-08-19 | End: 2024-08-22

## 2024-08-19 RX ORDER — IBUPROFEN 600 MG
600 TABLET ORAL EVERY 6 HOURS
Refills: 0 | Status: COMPLETED | OUTPATIENT
Start: 2024-08-19 | End: 2025-07-18

## 2024-08-19 RX ORDER — SODIUM CITRATE AND CITRIC ACID MONOHYDRATE 334; 500 MG/5ML; MG/5ML
15 SOLUTION ORAL EVERY 6 HOURS
Refills: 0 | Status: DISCONTINUED | OUTPATIENT
Start: 2024-08-19 | End: 2024-08-19

## 2024-08-19 RX ORDER — OXYTOCIN 10 UNIT/ML
333.33 AMPUL (ML) INJECTION
Qty: 20 | Refills: 0 | Status: COMPLETED | OUTPATIENT
Start: 2024-08-19

## 2024-08-19 RX ORDER — DIPHENHYDRAMINE HCL 50 MG
25 CAPSULE ORAL ONCE
Refills: 0 | Status: COMPLETED | OUTPATIENT
Start: 2024-08-19 | End: 2024-08-19

## 2024-08-19 RX ORDER — KETOROLAC TROMETHAMINE 30 MG/ML
30 INJECTION, SOLUTION INTRAMUSCULAR EVERY 6 HOURS
Refills: 0 | Status: COMPLETED | OUTPATIENT
Start: 2024-08-19 | End: 2024-08-20

## 2024-08-19 RX ORDER — AZITHROMYCIN 500 MG/1
500 TABLET, FILM COATED ORAL ONCE
Refills: 0 | Status: COMPLETED | OUTPATIENT
Start: 2024-08-19 | End: 2024-08-19

## 2024-08-19 RX ORDER — SODIUM CITRATE AND CITRIC ACID MONOHYDRATE 334; 500 MG/5ML; MG/5ML
30 SOLUTION ORAL ONCE
Refills: 0 | Status: DISCONTINUED | OUTPATIENT
Start: 2024-08-19 | End: 2024-08-19

## 2024-08-19 RX ORDER — FAMOTIDINE 10 MG/ML
20 INJECTION INTRAVENOUS ONCE
Refills: 0 | Status: DISCONTINUED | OUTPATIENT
Start: 2024-08-19 | End: 2024-08-19

## 2024-08-19 RX ORDER — ACETAMINOPHEN 325 MG/1
975 TABLET ORAL EVERY 6 HOURS
Refills: 0 | Status: DISCONTINUED | OUTPATIENT
Start: 2024-08-19 | End: 2024-08-22

## 2024-08-19 RX ORDER — HEPARIN SODIUM,BOVINE 1000/ML
5000 VIAL (ML) INJECTION EVERY 12 HOURS
Refills: 0 | Status: DISCONTINUED | OUTPATIENT
Start: 2024-08-19 | End: 2024-08-22

## 2024-08-19 RX ORDER — OXYCODONE HYDROCHLORIDE 5 MG/1
5 TABLET ORAL
Refills: 0 | Status: DISCONTINUED | OUTPATIENT
Start: 2024-08-19 | End: 2024-08-22

## 2024-08-19 RX ORDER — CEFAZOLIN SODIUM 2 G/100ML
2000 INJECTION, SOLUTION INTRAVENOUS ONCE
Refills: 0 | Status: DISCONTINUED | OUTPATIENT
Start: 2024-08-19 | End: 2024-08-22

## 2024-08-19 RX ORDER — DIPHENHYDRAMINE HCL 50 MG
25 CAPSULE ORAL EVERY 6 HOURS
Refills: 0 | Status: DISCONTINUED | OUTPATIENT
Start: 2024-08-19 | End: 2024-08-22

## 2024-08-19 RX ORDER — AMPICILLIN TRIHYDRATE 500 MG
1 CAPSULE ORAL EVERY 4 HOURS
Refills: 0 | Status: DISCONTINUED | OUTPATIENT
Start: 2024-08-19 | End: 2024-08-19

## 2024-08-19 RX ADMIN — Medication 80 MILLIGRAM(S): at 21:24

## 2024-08-19 RX ADMIN — Medication 325 MILLIGRAM(S): at 15:02

## 2024-08-19 RX ADMIN — Medication 200 GRAM(S): at 02:15

## 2024-08-19 RX ADMIN — ACETAMINOPHEN 1000 MILLIGRAM(S): 325 TABLET ORAL at 00:45

## 2024-08-19 RX ADMIN — KETOROLAC TROMETHAMINE 30 MILLIGRAM(S): 30 INJECTION, SOLUTION INTRAMUSCULAR at 16:02

## 2024-08-19 RX ADMIN — Medication 1000 MILLIUNIT(S)/MIN: at 04:27

## 2024-08-19 RX ADMIN — FAMOTIDINE 20 MILLIGRAM(S): 10 INJECTION INTRAVENOUS at 03:31

## 2024-08-19 RX ADMIN — VITAMIN A, ASCORBIC ACID, VITAMIN D, .ALPHA.-TOCOPHEROL, THIAMINE MONONITRATE, RIBOFLAVIN, NIACIN, PYRIDOXINE HYDROCHLORIDE, FOLIC ACID, CYANOCOBALAMIN, CALCIUM, IRON, MAGNESIUM, ZINC, AND COPPER 1 TABLET(S): 2700; 70; 400; 30; 1.6; 1.8; 18; 2.5; 1; 12; 100; 65; 25; 25; 2 TABLET ORAL at 15:02

## 2024-08-19 RX ADMIN — Medication 1 PATCH: at 01:07

## 2024-08-19 RX ADMIN — Medication 5000 UNIT(S): at 18:11

## 2024-08-19 RX ADMIN — ACETAMINOPHEN 975 MILLIGRAM(S): 325 TABLET ORAL at 21:24

## 2024-08-19 RX ADMIN — KETOROLAC TROMETHAMINE 30 MILLIGRAM(S): 30 INJECTION, SOLUTION INTRAMUSCULAR at 15:02

## 2024-08-19 RX ADMIN — ACETAMINOPHEN 975 MILLIGRAM(S): 325 TABLET ORAL at 22:24

## 2024-08-19 RX ADMIN — Medication 500 MILLIGRAM(S): at 15:03

## 2024-08-19 RX ADMIN — SODIUM CHLORIDE 125 MILLILITER(S): 9 INJECTION INTRAMUSCULAR; INTRAVENOUS; SUBCUTANEOUS at 02:30

## 2024-08-19 RX ADMIN — Medication 125 MILLILITER(S): at 02:29

## 2024-08-19 RX ADMIN — AZITHROMYCIN 255 MILLIGRAM(S): 500 TABLET, FILM COATED ORAL at 03:31

## 2024-08-19 RX ADMIN — Medication 25 MILLIGRAM(S): at 01:06

## 2024-08-19 NOTE — OB PROVIDER LABOR PROGRESS NOTE - NS_OBIHIFHRDETAILS_OBGYN_ALL_OB_FT
135 moderate variability with accels no decels
150bpm at baseline with moderate variability and accels, no decels
 moderate variability reactive
cat I
baseline 180, moderate variability, + accels, occasion variable decels

## 2024-08-19 NOTE — OB PROVIDER DELIVERY SUMMARY - NSPROVIDERDELIVERYNOTE_OBGYN_ALL_OB_FT
Delivered via lst CS liveborn male chadwick apgar 9/9.  Placenta spontaneously  and delivered intact.  Maternal and fetal side placenta cultures obtained.  Uterus firm.  Hysterotomy  closed with monocryl.  Rectii, fascia, subcutaneous layer and skin reapproximated in usual fashion.  Mother and baby stable.      IVF-1l  urine-100cc  ebl-650cc  dict#[id number]

## 2024-08-19 NOTE — CHART NOTE - NSCHARTNOTEFT_GEN_A_CORE
Late entry note    RN called PA because patient has complaints of continued strong back pain, Lidocaine patch in place, pt just received IV tylenol and benadryl. Pt was evaluated at the bedside. Pt notes FM denies CTX, LOF    Back: Tender to palpation of sacrum   Abd: Gravid, soft, non-tender  Ext: Soft, non-tender, no edema    - Pt to be transferred to L&D triage for NST and evaluation of labor    D/W Dr Johnson

## 2024-08-19 NOTE — CHART NOTE - NSCHARTNOTEFT_GEN_A_CORE
RN called PA to evaluate patient   patient c/o back pain. patient states she is unable to lay on back  patient states pain is 10 out of 10  Patient states pain started after she was transferred from L+D.   IV tylenol currently infusion  Patient states pain is lower back, does not radiate  patient admits to fetal movement  denies vaginal bleeding or leakage of fluids    bacK: mild point tenderness on palpation. no ecchymosis or sign of trauma     Plan:   RN to reassess pain once completion of IV tylenol   warm compress given  if pain does not subside, will try lidocaine patch  d/w elsy Douglas attending RN called PA to evaluate patient   patient c/o back pain. patient states she is unable to lay on back  patient states pain is 10 out of 10  Patient states pain started after she was transferred from L+D.   IV tylenol currently infusing  Patient states pain is lower back, does not radiate  patient admits to fetal movement  denies contractions, vaginal bleeding or leakage of fluids    bacK: mild point tenderness on palpation. no ecchymosis or sign of trauma     Plan:   RN to reassess pain once completion of IV tylenol   warm compress given  if pain does not subside, will try lidocaine patch  d/w elsy Douglas attending

## 2024-08-19 NOTE — CHART NOTE - NSCHARTNOTESELECT_GEN_ALL_CORE
Event Note
NST/Event Note
fransisco pickard note/Event Note
fransisco pickard note/Event Note
Event Note
Event Note
MFM consult/Event Note

## 2024-08-19 NOTE — OB RN DELIVERY SUMMARY - NSSELHIDDEN_OBGYN_ALL_OB_FT
[NS_DeliveryAttending1_OBGYN_ALL_OB_FT:[email or web address]=] [NS_DeliveryAttending1_OBGYN_ALL_OB_FT:[email or web address]=],[NS_DeliveryAssist1_OBGYN_ALL_OB_FT:[id number]=]

## 2024-08-19 NOTE — OB RN INTRAOPERATIVE NOTE - NSSELHIDDEN_OBGYN_ALL_OB_FT
[NS_DeliveryAttending1_OBGYN_ALL_OB_FT:[email or web address]=] [NS_DeliveryAttending1_OBGYN_ALL_OB_FT:[email or web address]=],[NS_DeliveryAssist1_OBGYN_ALL_OB_FT:[id number]=]

## 2024-08-19 NOTE — OB PROVIDER LABOR PROGRESS NOTE - ASSESSMENT
33 y/o  @ 34wks GDMA2, GHTN, with  ctx,more bloody show now with cervical change   - transfer to L&D per Dr. Moraes  - s/p beta x 2  - cont BP monitoring, normotensive   - cont insulin regimen and fingerstick fasting and post prandial   - case d/w and pt examined with Dr. Moraes, house attending
33 yo  at 33.2 wks GA in PTL now arrested with gHTN, gTCP, and GDMA2  - cont current monitoring    #arrested PTL  - BMTZ #2 at 9  - nifedipine 10mg q6hr until 9a on   - dc amp  - f/u ucx, GBS  - dc IVF    #gHTN  - currently normotensive, asx, P:C 0.1    #GDMA2  - FSG 4x/day  - lantus 20u qhs with ISS  - diabetic diet    d/w Dr. Tapia
33yo  iup at 34wks with  ctx and no cervical change/ GDMA2/ GHTN  FS 70 fasting , carb diet given   GHTN- BPs 109-126/70-88  will transfer to PP as per Be  s/p beta x 2  
a/p 30y.o f siup at 33.2wks with  labor; GHTN; gdma-2; obese   on insulin drip--> will start lantus 20qhs  no cervical change  start procardia 30 q6 x 48 hours for tocolysis  s/p betamethasone #1 today  MFM rounds discussed this case  d/w Dr. Emely woo  
35 yo  @ 34 0/7 weeks presents in  labor, GDMA2, s/p beta x 2, s/p mag, morphine 4mg 0500, Lantus 35qhs, 8u Humalog w/ meals, fingerstick 70 @ 0730 before breakfast, NST reactive and vital signs stable     Plan:  - Continue monitoring contractions and fetal tracing  - Continue consistent carb diet  - Continue monitoring pre and post prandial fingersticks  - Vitals q4H  - Pain management with morphine   - D/w Dr. Moraes
35 yo @ 34w1d presents in active  labor, GHTN, GDMA 2 on insulin, Cat 2 tracing     - FHT reviewed by Dr ruano   - Pt or transfer to L&D   - Admission labs collected  - PIH labs collected   - FS/ Alternating fluids and sliding scale ordered   - GBS PPX   - Pt requests epidural, anesthesiology consulted   - 1 L boulus   - AROM clr @ 2:20  - 2 units on hold     D/W Dr ruano

## 2024-08-19 NOTE — CHART NOTE - NSCHARTNOTEFT_GEN_A_CORE
Pt comfortable after epidural  SIUP 34.0w, P0, s/p BMZ 8/14,15, GDMA2  Cervix 7/100/-1/vtx, irregular contractions.   FH-cat 2 tracing, Fetal tachycardia with intermittent late decels mod variability, no improvement despite external resuscitation efforts   section discussed and agreed to by pt and . Risks/benefits of abdominal delivery now vs waiting for vaginal   delivery  reviewed and their questions were answered to their apparent satisfaction.  Sundar Spencer(anesthesia) and Gera(neonatology) notified.  Will proceed to OR 2

## 2024-08-19 NOTE — PROGRESS NOTE ADULT - SUBJECTIVE AND OBJECTIVE BOX
Pt seen at bedside resting comfortably and offers no complaints.  Jorge catheter in place draining adequate urine. Pt denies CP, SOB, N/V, dizziness, palpitations or any other complaints.    T(C): 37.7 (08-19-24 @ 08:10), Max: 37.9 (08-19-24 @ 07:15)  HR: 84 (08-19-24 @ 08:10) (65 - 84)  BP: 136/79 (08-19-24 @ 08:10) (85/46 - 151/81)  RR: 18 (08-19-24 @ 08:10) (18 - 22)  SpO2: 98% (08-19-24 @ 08:10) (97% - 100%)    abd: soft/nt, fundus firm, dressing in place C/D/I  pelvic: minimal lochia  ext: venodynes in place; no calf tenderness                     12.9   15.15  )----------(  167       ( 19 Aug 2024 02:25 )               38.0        A/p: 35 yo POD #0 s/p urgent c/s @ 34.1   -continue close monitor   -continue post op care  -d/w Dr. Sommer  Pt seen at bedside resting comfortably and offers no complaints.  Jorge catheter in place draining adequate urine. Pt denies CP, SOB, N/V, dizziness, palpitations or any other complaints.    T(C): 37.7 (08-19-24 @ 08:10), Max: 37.9 (08-19-24 @ 07:15)  HR: 84 (08-19-24 @ 08:10) (65 - 84)  BP: 136/79 (08-19-24 @ 08:10) (85/46 - 151/81)  RR: 18 (08-19-24 @ 08:10) (18 - 22)  SpO2: 98% (08-19-24 @ 08:10) (97% - 100%)    abd: soft/nt, fundus firm, dressing in place C/D/I  pelvic: minimal lochia  ext: venodynes in place; no calf tenderness                     12.9   15.15  )----------(  167       ( 19 Aug 2024 02:25 )               38.0

## 2024-08-19 NOTE — OB PROVIDER DELIVERY SUMMARY - NSSELHIDDEN_OBGYN_ALL_OB_FT
[NS_DeliveryAttending1_OBGYN_ALL_OB_FT:[email or web address]=],[NS_DeliveryAssist1_OBGYN_ALL_OB_FT:[id number]=]

## 2024-08-20 LAB
BASOPHILS # BLD AUTO: 0.02 K/UL — SIGNIFICANT CHANGE UP (ref 0–0.2)
BASOPHILS NFR BLD AUTO: 0.2 % — SIGNIFICANT CHANGE UP (ref 0–2)
EOSINOPHIL # BLD AUTO: 0.02 K/UL — SIGNIFICANT CHANGE UP (ref 0–0.5)
EOSINOPHIL NFR BLD AUTO: 0.2 % — SIGNIFICANT CHANGE UP (ref 0–6)
GLUCOSE BLDC GLUCOMTR-MCNC: 127 MG/DL — HIGH (ref 70–99)
GLUCOSE BLDC GLUCOMTR-MCNC: 146 MG/DL — HIGH (ref 70–99)
GLUCOSE BLDC GLUCOMTR-MCNC: 148 MG/DL — HIGH (ref 70–99)
GLUCOSE BLDC GLUCOMTR-MCNC: 210 MG/DL — HIGH (ref 70–99)
GLUCOSE BLDC GLUCOMTR-MCNC: 81 MG/DL — SIGNIFICANT CHANGE UP (ref 70–99)
HCT VFR BLD CALC: 33.4 % — LOW (ref 34.5–45)
HGB BLD-MCNC: 11.1 G/DL — LOW (ref 11.5–15.5)
IMM GRANULOCYTES NFR BLD AUTO: 0.3 % — SIGNIFICANT CHANGE UP (ref 0–0.9)
LYMPHOCYTES # BLD AUTO: 2.99 K/UL — SIGNIFICANT CHANGE UP (ref 1–3.3)
LYMPHOCYTES # BLD AUTO: 26 % — SIGNIFICANT CHANGE UP (ref 13–44)
MCHC RBC-ENTMCNC: 27.1 PG — SIGNIFICANT CHANGE UP (ref 27–34)
MCHC RBC-ENTMCNC: 33.2 GM/DL — SIGNIFICANT CHANGE UP (ref 32–36)
MCV RBC AUTO: 81.5 FL — SIGNIFICANT CHANGE UP (ref 80–100)
MONOCYTES # BLD AUTO: 1.11 K/UL — HIGH (ref 0–0.9)
MONOCYTES NFR BLD AUTO: 9.7 % — SIGNIFICANT CHANGE UP (ref 2–14)
NEUTROPHILS # BLD AUTO: 7.31 K/UL — SIGNIFICANT CHANGE UP (ref 1.8–7.4)
NEUTROPHILS NFR BLD AUTO: 63.6 % — SIGNIFICANT CHANGE UP (ref 43–77)
NRBC # BLD: 0 /100 WBCS — SIGNIFICANT CHANGE UP (ref 0–0)
PLATELET # BLD AUTO: 139 K/UL — LOW (ref 150–400)
RBC # BLD: 4.1 M/UL — SIGNIFICANT CHANGE UP (ref 3.8–5.2)
RBC # FLD: 12.2 % — SIGNIFICANT CHANGE UP (ref 10.3–14.5)
WBC # BLD: 11.49 K/UL — HIGH (ref 3.8–10.5)
WBC # FLD AUTO: 11.49 K/UL — HIGH (ref 3.8–10.5)

## 2024-08-20 RX ORDER — IBUPROFEN 600 MG
600 TABLET ORAL EVERY 6 HOURS
Refills: 0 | Status: DISCONTINUED | OUTPATIENT
Start: 2024-08-20 | End: 2024-08-22

## 2024-08-20 RX ORDER — FAMOTIDINE 10 MG/ML
20 INJECTION INTRAVENOUS
Refills: 0 | Status: DISCONTINUED | OUTPATIENT
Start: 2024-08-20 | End: 2024-08-22

## 2024-08-20 RX ORDER — KETOROLAC TROMETHAMINE 30 MG/ML
30 INJECTION, SOLUTION INTRAMUSCULAR EVERY 6 HOURS
Refills: 0 | Status: DISCONTINUED | OUTPATIENT
Start: 2024-08-20 | End: 2024-08-22

## 2024-08-20 RX ADMIN — ACETAMINOPHEN 975 MILLIGRAM(S): 325 TABLET ORAL at 22:30

## 2024-08-20 RX ADMIN — Medication 600 MILLIGRAM(S): at 11:45

## 2024-08-20 RX ADMIN — Medication 325 MILLIGRAM(S): at 11:45

## 2024-08-20 RX ADMIN — Medication 5000 UNIT(S): at 18:15

## 2024-08-20 RX ADMIN — Medication 5000 UNIT(S): at 06:00

## 2024-08-20 RX ADMIN — ACETAMINOPHEN 975 MILLIGRAM(S): 325 TABLET ORAL at 07:00

## 2024-08-20 RX ADMIN — Medication 600 MILLIGRAM(S): at 12:45

## 2024-08-20 RX ADMIN — KETOROLAC TROMETHAMINE 30 MILLIGRAM(S): 30 INJECTION, SOLUTION INTRAMUSCULAR at 01:24

## 2024-08-20 RX ADMIN — Medication 600 MILLIGRAM(S): at 18:16

## 2024-08-20 RX ADMIN — FAMOTIDINE 20 MILLIGRAM(S): 10 INJECTION INTRAVENOUS at 06:01

## 2024-08-20 RX ADMIN — FAMOTIDINE 20 MILLIGRAM(S): 10 INJECTION INTRAVENOUS at 18:17

## 2024-08-20 RX ADMIN — Medication 600 MILLIGRAM(S): at 19:10

## 2024-08-20 RX ADMIN — KETOROLAC TROMETHAMINE 30 MILLIGRAM(S): 30 INJECTION, SOLUTION INTRAMUSCULAR at 06:34

## 2024-08-20 RX ADMIN — KETOROLAC TROMETHAMINE 30 MILLIGRAM(S): 30 INJECTION, SOLUTION INTRAMUSCULAR at 07:00

## 2024-08-20 RX ADMIN — VITAMIN A, ASCORBIC ACID, VITAMIN D, .ALPHA.-TOCOPHEROL, THIAMINE MONONITRATE, RIBOFLAVIN, NIACIN, PYRIDOXINE HYDROCHLORIDE, FOLIC ACID, CYANOCOBALAMIN, CALCIUM, IRON, MAGNESIUM, ZINC, AND COPPER 1 TABLET(S): 2700; 70; 400; 30; 1.6; 1.8; 18; 2.5; 1; 12; 100; 65; 25; 25; 2 TABLET ORAL at 11:46

## 2024-08-20 RX ADMIN — ACETAMINOPHEN 975 MILLIGRAM(S): 325 TABLET ORAL at 06:01

## 2024-08-20 RX ADMIN — Medication 600 MILLIGRAM(S): at 23:42

## 2024-08-20 RX ADMIN — ACETAMINOPHEN 975 MILLIGRAM(S): 325 TABLET ORAL at 15:09

## 2024-08-20 RX ADMIN — Medication 80 MILLIGRAM(S): at 06:02

## 2024-08-20 RX ADMIN — Medication 30 MILLILITER(S): at 21:32

## 2024-08-20 RX ADMIN — ACETAMINOPHEN 975 MILLIGRAM(S): 325 TABLET ORAL at 21:32

## 2024-08-20 RX ADMIN — ACETAMINOPHEN 975 MILLIGRAM(S): 325 TABLET ORAL at 16:09

## 2024-08-20 RX ADMIN — Medication 500 MILLIGRAM(S): at 11:46

## 2024-08-20 RX ADMIN — KETOROLAC TROMETHAMINE 30 MILLIGRAM(S): 30 INJECTION, SOLUTION INTRAMUSCULAR at 00:22

## 2024-08-20 NOTE — DISCHARGE NOTE OB - HOME CARE AGENCY TYPE OF SERVICE:
Home Visiting Nurse for BP monitoring CERTIFIED HOME CARE AGENCY FOR BLOOD PRESSURE & GLUCOSE MONITORING MATERNAL CARE NAVIGATION FOR BLOOD PRESSURE & GLUCOSE MONITORING

## 2024-08-20 NOTE — PROGRESS NOTE ADULT - SUBJECTIVE AND OBJECTIVE BOX
Patient seen at bedside resting comfortably offers no new complaints. Ambulating,.+ spontaneous void.  + flatus;  no bowel movement; tolerating regular diet.  Pt both breast and bottle feeding. Pt denies headache, weakness, chest pain, shortness of breath, blurry vision or epigastric pain, N/V/D,  palpitations, worsening vaginal bleeding.    Vital Signs Last 24 Hrs  T(C): 36.4 (20 Aug 2024 07:41), Max: 37.7 (19 Aug 2024 08:10)  T(F): 97.6 (20 Aug 2024 07:41), Max: 99.8 (19 Aug 2024 08:10)  HR: 64 (20 Aug 2024 07:41) (64 - 91)  BP: 127/84 (20 Aug 2024 07:41) (109/71 - 136/79)  RR: 18 (20 Aug 2024 07:41) (17 - 18)  SpO2: 96% (20 Aug 2024 07:41) (96% - 98%)   Parameters below as of 20 Aug 2024 07:41  Patient On (Oxygen Delivery Method): room air        Gen: A&O x 3, NAD  Chest: CTA B/L  Cardiac: S1,S2  RRR  Breast: Soft, nontender, nonengorged  Abdomen: soft; Nontender, nondistended; dressing removed incision C/D/I steri strips in place  Gyn: minimal lochia   Extremities: Nontender, no calf tenderness                          11.1   11.49 )-----------( 139      ( 20 Aug 2024 06:30 )             33.4     CAPILLARY BLOOD GLUCOSE      POCT Blood Glucose.: 81 mg/dL (20 Aug 2024 06:11)  POCT Blood Glucose.: 122 mg/dL (19 Aug 2024 22:06)  POCT Blood Glucose.: 82 mg/dL (19 Aug 2024 16:50)  POCT Blood Glucose.: 117 mg/dL (19 Aug 2024 11:55)

## 2024-08-20 NOTE — DISCHARGE NOTE OB - MEDICATION SUMMARY - MEDICATIONS TO STOP TAKING
I will STOP taking the medications listed below when I get home from the hospital:    clotrimazole 2% vaginal cream with applicator  -- 1 applicatorful intravaginally once a day (at bedtime)

## 2024-08-20 NOTE — DISCHARGE NOTE OB - CARE PLAN
1 Principal Discharge DX:	 delivery delivered  Assessment and plan of treatment:	no sex, nothing in the vagina for 6 weeks, no heavy lifting/pushing, no bending for 4 weeks, no strenuous activity, motrin prn for pain, keep incision clean and dry, shower daily, ambulate, fiber diet, f/u in 2 weeks for wound check and in 6 weeks for postpartum visit  Secondary Diagnosis:	Gestational thrombocytopenia  Secondary Diagnosis:	Gestational diabetes  Assessment and plan of treatment:	repeat glucose testing 6 weeks postpartum  endocrine consult  Secondary Diagnosis:	 labor  Secondary Diagnosis:	Gestational hypertension  Assessment and plan of treatment:	follow-up with clinician in 1 week for blood pressure check, return to ER if headache, epigastric pain, visual changes or increased swelling  take blood pressure 3x a day and record the readings, if blood pressure >140/90, call your doctor; if blood pressure >150/100, come to the ER

## 2024-08-20 NOTE — DISCHARGE NOTE OB - CARE PROVIDER_API CALL
Nadeen Sommer  Obstetrics and Gynecology  [address] [place] [address]  Phone: [phone]  Fax: [phone]  Follow Up Time: 1 week

## 2024-08-20 NOTE — DISCHARGE NOTE OB - HOSPITAL COURSE
patient presented at 33 weeks in  labor  GDMA2 s/p beta x2, needed insulin drip HD#1  dx gestational hypertension on HD#1  hd#2 transferred to antepartum stable, needed sliding scale for uncontrolled GDMA  hd#6 urgent c/s for cat 2 fetal tracing  endocrine consult  case mngt

## 2024-08-20 NOTE — DISCHARGE NOTE OB - PATIENT PORTAL LINK FT
You can access the FollowMyHealth Patient Portal offered by [facility] by registering at the following website: [email or web address]. By joining [facility]’s FollowMyHealth portal, you will also be able to view your health information using other applications (apps) compatible with our system.

## 2024-08-20 NOTE — DISCHARGE NOTE OB - ADDITIONAL INSTRUCTIONS
follow-up with clinician in 1 week for blood pressure check, return to ER if headache, epigastric pain, visual changes or increased swelling  take blood pressure 3x a day and record the readings, if blood pressure >140/90, call your doctor; if blood pressure >150/100, come to the ER  no sex, nothing in the vagina for 6 weeks, no heavy lifting/pushing, no bending for 4 weeks, no strenuous activity, motrin prn for pain, keep incision clean and dry, shower daily, ambulate, fiber diet, f/u in 2 weeks for wound check and in 6 weeks for postpartum visit

## 2024-08-20 NOTE — DISCHARGE NOTE OB - PLAN OF CARE
follow-up with clinician in 1 week for blood pressure check, return to ER if headache, epigastric pain, visual changes or increased swelling  take blood pressure 3x a day and record the readings, if blood pressure >140/90, call your doctor; if blood pressure >150/100, come to the ER no sex, nothing in the vagina for 6 weeks, no heavy lifting/pushing, no bending for 4 weeks, no strenuous activity, motrin prn for pain, keep incision clean and dry, shower daily, ambulate, fiber diet, f/u in 2 weeks for wound check and in 6 weeks for postpartum visit repeat glucose testing 6 weeks postpartum  endocrine consult

## 2024-08-20 NOTE — DISCHARGE NOTE OB - NS MD DC FALL RISK RISK
For information on Fall & Injury Prevention, visit: [email or web address] OR  [email or web address] OR  https://www.cdc.gov/steadi/patient.html

## 2024-08-20 NOTE — DISCHARGE NOTE OB - MATERIALS PROVIDED
Vaccinations/Breastfeeding Log/Bottle Feeding Log/Breastfeeding Mother’s Support Group Information/Guide to Postpartum Care/Shaken Baby Prevention Handout/Breastfeeding Guide and Packet/Birth Certificate Instructions/Discharge Medication Information for Patients and Families Pocket Guide/Letter of Medical Neccessity

## 2024-08-20 NOTE — DISCHARGE NOTE OB - MEDICATION SUMMARY - MEDICATIONS TO TAKE
I will START or STAY ON the medications listed below when I get home from the hospital:    blood pressure cuff  -- Please take blood pressure 3-4 times per day and record readings  -- Indication: For Gestational hypertension    ibuprofen 600 mg oral tablet  -- 1 tab(s) by mouth every 6 hours as needed for  moderate pain  -- Indication: For  delivery delivered    acetaminophen 500 mg oral tablet  -- 2 tab(s) by mouth every 6 hours as needed for  mild pain  -- Indication: For  delivery delivered    Prenatal Plus oral tablet  -- 1 tab(s) by mouth once a day  -- Indication: For  delivery delivered    Colace 2-in-1 50 mg-8.6 mg oral tablet  -- 2 tab(s) by mouth once a day (at bedtime) as needed for  constipation  -- Indication: For  delivery delivered

## 2024-08-21 LAB
BASOPHILS # BLD AUTO: 0.02 K/UL — SIGNIFICANT CHANGE UP (ref 0–0.2)
BASOPHILS NFR BLD AUTO: 0.2 % — SIGNIFICANT CHANGE UP (ref 0–2)
EOSINOPHIL # BLD AUTO: 0.08 K/UL — SIGNIFICANT CHANGE UP (ref 0–0.5)
EOSINOPHIL NFR BLD AUTO: 0.8 % — SIGNIFICANT CHANGE UP (ref 0–6)
GLUCOSE BLDC GLUCOMTR-MCNC: 135 MG/DL — HIGH (ref 70–99)
GLUCOSE BLDC GLUCOMTR-MCNC: 70 MG/DL — SIGNIFICANT CHANGE UP (ref 70–99)
GLUCOSE BLDC GLUCOMTR-MCNC: 77 MG/DL — SIGNIFICANT CHANGE UP (ref 70–99)
GLUCOSE BLDC GLUCOMTR-MCNC: 88 MG/DL — SIGNIFICANT CHANGE UP (ref 70–99)
HCT VFR BLD CALC: 33 % — LOW (ref 34.5–45)
HGB BLD-MCNC: 11 G/DL — LOW (ref 11.5–15.5)
IMM GRANULOCYTES NFR BLD AUTO: 0.3 % — SIGNIFICANT CHANGE UP (ref 0–0.9)
LYMPHOCYTES # BLD AUTO: 2.6 K/UL — SIGNIFICANT CHANGE UP (ref 1–3.3)
LYMPHOCYTES # BLD AUTO: 26.4 % — SIGNIFICANT CHANGE UP (ref 13–44)
MCHC RBC-ENTMCNC: 27.1 PG — SIGNIFICANT CHANGE UP (ref 27–34)
MCHC RBC-ENTMCNC: 33.3 GM/DL — SIGNIFICANT CHANGE UP (ref 32–36)
MCV RBC AUTO: 81.3 FL — SIGNIFICANT CHANGE UP (ref 80–100)
MONOCYTES # BLD AUTO: 0.72 K/UL — SIGNIFICANT CHANGE UP (ref 0–0.9)
MONOCYTES NFR BLD AUTO: 7.3 % — SIGNIFICANT CHANGE UP (ref 2–14)
NEUTROPHILS # BLD AUTO: 6.39 K/UL — SIGNIFICANT CHANGE UP (ref 1.8–7.4)
NEUTROPHILS NFR BLD AUTO: 65 % — SIGNIFICANT CHANGE UP (ref 43–77)
NRBC # BLD: 0 /100 WBCS — SIGNIFICANT CHANGE UP (ref 0–0)
PLATELET # BLD AUTO: 152 K/UL — SIGNIFICANT CHANGE UP (ref 150–400)
RBC # BLD: 4.06 M/UL — SIGNIFICANT CHANGE UP (ref 3.8–5.2)
RBC # FLD: 12.5 % — SIGNIFICANT CHANGE UP (ref 10.3–14.5)
WBC # BLD: 9.84 K/UL — SIGNIFICANT CHANGE UP (ref 3.8–10.5)
WBC # FLD AUTO: 9.84 K/UL — SIGNIFICANT CHANGE UP (ref 3.8–10.5)

## 2024-08-21 RX ADMIN — ACETAMINOPHEN 975 MILLIGRAM(S): 325 TABLET ORAL at 22:25

## 2024-08-21 RX ADMIN — Medication 325 MILLIGRAM(S): at 11:53

## 2024-08-21 RX ADMIN — ACETAMINOPHEN 975 MILLIGRAM(S): 325 TABLET ORAL at 23:25

## 2024-08-21 RX ADMIN — FAMOTIDINE 20 MILLIGRAM(S): 10 INJECTION INTRAVENOUS at 05:35

## 2024-08-21 RX ADMIN — Medication 600 MILLIGRAM(S): at 17:24

## 2024-08-21 RX ADMIN — ACETAMINOPHEN 975 MILLIGRAM(S): 325 TABLET ORAL at 17:24

## 2024-08-21 RX ADMIN — Medication 600 MILLIGRAM(S): at 05:35

## 2024-08-21 RX ADMIN — Medication 5000 UNIT(S): at 17:20

## 2024-08-21 RX ADMIN — Medication 600 MILLIGRAM(S): at 16:24

## 2024-08-21 RX ADMIN — Medication 500 MILLIGRAM(S): at 11:53

## 2024-08-21 RX ADMIN — Medication 600 MILLIGRAM(S): at 06:30

## 2024-08-21 RX ADMIN — VITAMIN A, ASCORBIC ACID, VITAMIN D, .ALPHA.-TOCOPHEROL, THIAMINE MONONITRATE, RIBOFLAVIN, NIACIN, PYRIDOXINE HYDROCHLORIDE, FOLIC ACID, CYANOCOBALAMIN, CALCIUM, IRON, MAGNESIUM, ZINC, AND COPPER 1 TABLET(S): 2700; 70; 400; 30; 1.6; 1.8; 18; 2.5; 1; 12; 100; 65; 25; 25; 2 TABLET ORAL at 11:53

## 2024-08-21 RX ADMIN — ACETAMINOPHEN 975 MILLIGRAM(S): 325 TABLET ORAL at 16:24

## 2024-08-21 RX ADMIN — Medication 5000 UNIT(S): at 05:35

## 2024-08-21 NOTE — PROGRESS NOTE ADULT - PROBLEM SELECTOR PLAN 2
A/P: POD #2 s/p c/s; Gdma-2 and ghtn  Endo  notified  case management  -Pain management as needed  -cont post op care  -OOB and ambulate  - f/u Rpt CBC   -encourage insentive spirometer use  -d/w dr. Alex woo
Lantus 28U QHS and sliding scale for glycemic control and Humalog 10 units with every meal  will discuss with MFM after NST

## 2024-08-21 NOTE — PROGRESS NOTE ADULT - PROBLEM SELECTOR PLAN 4
A/P: POD #2 s/p c/s; Gdma-2 and ghtn  Endo  notified  case management  -Pain management as needed  -cont post op care  -OOB and ambulate  - f/u Rpt CBC   -encourage insentive spirometer use  -d/w dr. Alex woo

## 2024-08-21 NOTE — PROGRESS NOTE ADULT - SUBJECTIVE AND OBJECTIVE BOX
Patient seen at bedisde resting comfortably offers no new complaints. + Ambulation, + void without difficulty, + flatus; Denies HA, CP, SOB, N/V/D,  no bm; dizziness, palpitations, worsening abdominal pain, worsening vaginal bleeding, or any other concerns. Baby in NICU  Vital Signs Last 24 Hrs  T(C): 36.6 (21 Aug 2024 08:26), Max: 36.9 (20 Aug 2024 19:50)  T(F): 97.9 (21 Aug 2024 08:26), Max: 98.4 (20 Aug 2024 19:50)  HR: 73 (21 Aug 2024 08:26) (69 - 75)  BP: 128/82 (21 Aug 2024 08:26) (106/72 - 135/79)  BP(mean): 98 (21 Aug 2024 04:05) (98 - 98)  RR: 18 (21 Aug 2024 08:26) (16 - 18)  SpO2: 96% (21 Aug 2024 08:26) (95% - 98%)    Parameters below as of 21 Aug 2024 08:26  Patient On (Oxygen Delivery Method): room air        Gen: A&O x 3, NAD  Chest: CTABL  Cardiac: S1+S2+ RRR  Breast: Soft, nontender, nonengorged  Abdomen: +BS; soft; Nontender, nondistended, dressing removed Incision C/D/I steri strips in place   Gyn: Minimal lochia  Extremities: Nontender, DTRS 2+, no worsening edema                        11.0   9.84  )-----------( 152      ( 21 Aug 2024 07:45 )             33.0       A/P: POD #2 s/p c/s; Gdma-2 and ghtn  Endo  notified  -Pain management as needed  -cont post op care  -OOB and ambulate  - f/u Rpt CBC   -encourage insentive spirometer use  -d/w dr. Alex woo

## 2024-08-21 NOTE — PROGRESS NOTE ADULT - PROBLEM SELECTOR PLAN 3
repeat CBC in AM
A/P: POD #2 s/p c/s; Gdma-2 and ghtn  Endo  notified  case management  -Pain management as needed  -cont post op care  -OOB and ambulate  - f/u Rpt CBC   -encourage insentive spirometer use  -d/w dr. Alex woo

## 2024-08-22 VITALS
RESPIRATION RATE: 18 BRPM | TEMPERATURE: 98 F | OXYGEN SATURATION: 99 % | SYSTOLIC BLOOD PRESSURE: 115 MMHG | DIASTOLIC BLOOD PRESSURE: 74 MMHG | HEART RATE: 71 BPM

## 2024-08-22 DIAGNOSIS — O13.9 GESTATIONAL [PREGNANCY-INDUCED] HYPERTENSION WITHOUT SIGNIFICANT PROTEINURIA, UNSPECIFIED TRIMESTER: ICD-10-CM

## 2024-08-22 LAB
-  AMOXICILLIN/CLAVULANIC ACID: SIGNIFICANT CHANGE UP
-  AMPICILLIN/SULBACTAM: SIGNIFICANT CHANGE UP
-  AMPICILLIN: SIGNIFICANT CHANGE UP
-  AZTREONAM: SIGNIFICANT CHANGE UP
-  CEFAZOLIN: SIGNIFICANT CHANGE UP
-  CEFEPIME: SIGNIFICANT CHANGE UP
-  CEFOXITIN: SIGNIFICANT CHANGE UP
-  CEFTRIAXONE: SIGNIFICANT CHANGE UP
-  CIPROFLOXACIN: SIGNIFICANT CHANGE UP
-  ERTAPENEM: SIGNIFICANT CHANGE UP
-  GENTAMICIN: SIGNIFICANT CHANGE UP
-  IMIPENEM: SIGNIFICANT CHANGE UP
-  LEVOFLOXACIN: SIGNIFICANT CHANGE UP
-  MEROPENEM: SIGNIFICANT CHANGE UP
-  PIPERACILLIN/TAZOBACTAM: SIGNIFICANT CHANGE UP
-  TOBRAMYCIN: SIGNIFICANT CHANGE UP
-  TRIMETHOPRIM/SULFAMETHOXAZOLE: SIGNIFICANT CHANGE UP
GLUCOSE BLDC GLUCOMTR-MCNC: 134 MG/DL — HIGH (ref 70–99)
GLUCOSE BLDC GLUCOMTR-MCNC: 82 MG/DL — SIGNIFICANT CHANGE UP (ref 70–99)
METHOD TYPE: SIGNIFICANT CHANGE UP

## 2024-08-22 PROCEDURE — 86703 HIV-1/HIV-2 1 RESULT ANTBDY: CPT

## 2024-08-22 PROCEDURE — 87186 SC STD MICRODIL/AGAR DIL: CPT

## 2024-08-22 PROCEDURE — 36415 COLL VENOUS BLD VENIPUNCTURE: CPT

## 2024-08-22 PROCEDURE — 80048 BASIC METABOLIC PNL TOTAL CA: CPT

## 2024-08-22 PROCEDURE — 87075 CULTR BACTERIA EXCEPT BLOOD: CPT

## 2024-08-22 PROCEDURE — 85610 PROTHROMBIN TIME: CPT

## 2024-08-22 PROCEDURE — 59050 FETAL MONITOR W/REPORT: CPT

## 2024-08-22 PROCEDURE — 81003 URINALYSIS AUTO W/O SCOPE: CPT

## 2024-08-22 PROCEDURE — 81001 URINALYSIS AUTO W/SCOPE: CPT

## 2024-08-22 PROCEDURE — 83615 LACTATE (LD) (LDH) ENZYME: CPT

## 2024-08-22 PROCEDURE — 87077 CULTURE AEROBIC IDENTIFY: CPT

## 2024-08-22 PROCEDURE — 76819 FETAL BIOPHYS PROFIL W/O NST: CPT

## 2024-08-22 PROCEDURE — 86901 BLOOD TYPING SEROLOGIC RH(D): CPT

## 2024-08-22 PROCEDURE — 76815 OB US LIMITED FETUS(S): CPT

## 2024-08-22 PROCEDURE — 85025 COMPLETE CBC W/AUTO DIFF WBC: CPT

## 2024-08-22 PROCEDURE — 84550 ASSAY OF BLOOD/URIC ACID: CPT

## 2024-08-22 PROCEDURE — 87086 URINE CULTURE/COLONY COUNT: CPT

## 2024-08-22 PROCEDURE — 80053 COMPREHEN METABOLIC PANEL: CPT

## 2024-08-22 PROCEDURE — 87070 CULTURE OTHR SPECIMN AEROBIC: CPT

## 2024-08-22 PROCEDURE — 85384 FIBRINOGEN ACTIVITY: CPT

## 2024-08-22 PROCEDURE — 85730 THROMBOPLASTIN TIME PARTIAL: CPT

## 2024-08-22 PROCEDURE — 87389 HIV-1 AG W/HIV-1&-2 AB AG IA: CPT

## 2024-08-22 PROCEDURE — 86780 TREPONEMA PALLIDUM: CPT

## 2024-08-22 PROCEDURE — 86900 BLOOD TYPING SEROLOGIC ABO: CPT

## 2024-08-22 PROCEDURE — 87491 CHLMYD TRACH DNA AMP PROBE: CPT

## 2024-08-22 PROCEDURE — 87653 STREP B DNA AMP PROBE: CPT

## 2024-08-22 PROCEDURE — 86923 COMPATIBILITY TEST ELECTRIC: CPT

## 2024-08-22 PROCEDURE — 83036 HEMOGLOBIN GLYCOSYLATED A1C: CPT

## 2024-08-22 PROCEDURE — 88307 TISSUE EXAM BY PATHOLOGIST: CPT

## 2024-08-22 PROCEDURE — 82570 ASSAY OF URINE CREATININE: CPT

## 2024-08-22 PROCEDURE — 82962 GLUCOSE BLOOD TEST: CPT

## 2024-08-22 PROCEDURE — 87591 N.GONORRHOEAE DNA AMP PROB: CPT

## 2024-08-22 PROCEDURE — 84156 ASSAY OF PROTEIN URINE: CPT

## 2024-08-22 PROCEDURE — 86850 RBC ANTIBODY SCREEN: CPT

## 2024-08-22 RX ORDER — IBUPROFEN 600 MG
1 TABLET ORAL
Qty: 28 | Refills: 0
Start: 2024-08-22 | End: 2024-08-28

## 2024-08-22 RX ORDER — ACETAMINOPHEN 325 MG/1
2 TABLET ORAL
Qty: 56 | Refills: 0
Start: 2024-08-22 | End: 2024-08-28

## 2024-08-22 RX ORDER — VITAMIN A, ASCORBIC ACID, VITAMIN D, .ALPHA.-TOCOPHEROL, THIAMINE MONONITRATE, RIBOFLAVIN, NIACIN, PYRIDOXINE HYDROCHLORIDE, FOLIC ACID, CYANOCOBALAMIN, CALCIUM, IRON, MAGNESIUM, ZINC, AND COPPER 2700; 70; 400; 30; 1.6; 1.8; 18; 2.5; 1; 12; 100; 65; 25; 25; 2 [IU]/1; MG/1; [IU]/1; [IU]/1; MG/1; MG/1; MG/1; MG/1; MG/1; UG/1; MG/1; MG/1; MG/1; MG/1; MG/1
1 TABLET ORAL
Qty: 30 | Refills: 0
Start: 2024-08-22 | End: 2024-09-20

## 2024-08-22 RX ADMIN — Medication 600 MILLIGRAM(S): at 05:59

## 2024-08-22 RX ADMIN — Medication 5000 UNIT(S): at 06:00

## 2024-08-22 RX ADMIN — FAMOTIDINE 20 MILLIGRAM(S): 10 INJECTION INTRAVENOUS at 06:00

## 2024-08-22 RX ADMIN — Medication 600 MILLIGRAM(S): at 06:34

## 2024-08-22 NOTE — LACTATION INITIAL EVALUATION - INTERVENTION OUTCOME
verbalizes understanding/demonstrates understanding of teaching/needs met/discharge criteria met
verbalizes understanding/demonstrates understanding of teaching/needs met/Lactation team to follow up
verbalizes understanding/demonstrates understanding of teaching/needs met/discharge criteria met

## 2024-08-22 NOTE — LACTATION INITIAL EVALUATION - LACTATION INTERVENTIONS
NICU infant 34 weeks 1 day. Mom taught hand expression and provided with electric breast pump.  Instructions given for use, frequency and duration, collection and storage and cleaning of kit parts.  Reinforced importance of stimulating/expressing 8-12X/24 hours for the establishment, maintenance and expression of breastmilk./initiate/review hand expression/initiate/review pumping guidelines and safe milk handling/review techniques to increase milk supply/initiate/review breast massage/compression
NICU infant 34 weeks 1 day. Reviewed hand expression and electric breast pump use.  Reviewed instructions given for use, frequency and duration, collection and storage and cleaning of kit parts.  Reinforced importance of stimulating/expressing 8-12X/24 hours for the establishment, maintenance and expression of breastmilk. Attended mother-baby breastfeeding/discharge class today; pt's questions/concerns regarding breastfeeding and general self and 's care addressed. tele-lactation program for breastfeeding f/u assessment and support post dc offered to patient but declined at this time/initiate/review safe skin-to-skin/initiate/review hand expression/initiate/review pumping guidelines and safe milk handling/post discharge community resources provided/initiate/review supplementation plan due to medical indications/review techniques to increase milk supply/review techniques to manage sore nipples/engorgement/initiate/review breast massage/compression/reviewed components of an effective feeding and at least 8 effective feedings per day required/reviewed importance of monitoring infant diapers, the breastfeeding log, and minimum output each day/reviewed risks of unnecessary formula supplementation/reviewed risks of artificial nipples/reviewed strategies to transition to breastfeeding only/reviewed benefits and recommendations for rooming in/reviewed feeding on demand/by cue at least 8 times a day/recommended follow-up with pediatrician within 24 hours of discharge/reviewed indications of inadequate milk transfer that would require supplementation
NICU infant 34 weeks 1 day. Reviewed hand expression and electric breast pump use.  Reviewed instructions given for use, frequency and duration, collection and storage and cleaning of kit parts.  Reinforced importance of stimulating/expressing 8-12X/24 hours for the establishment, maintenance and expression of breastmilk. Attended mother-baby breastfeeding/discharge class today; pt's questions/concerns regarding breastfeeding and general self and 's care addressed. tele-lactation program for breastfeeding f/u assessment and support post dc offered to patient but declined at this time./initiate/review safe skin-to-skin/initiate/review hand expression/initiate/review pumping guidelines and safe milk handling/initiate/review techniques for position and latch/post discharge community resources provided/initiate/review supplementation plan due to medical indications/review techniques to increase milk supply/review techniques to manage sore nipples/engorgement/initiate/review breast massage/compression/reviewed components of an effective feeding and at least 8 effective feedings per day required/reviewed importance of monitoring infant diapers, the breastfeeding log, and minimum output each day/reviewed risks of unnecessary formula supplementation/reviewed risks of artificial nipples/reviewed strategies to transition to breastfeeding only/reviewed benefits and recommendations for rooming in/reviewed feeding on demand/by cue at least 8 times a day/recommended follow-up with pediatrician within 24 hours of discharge/reviewed indications of inadequate milk transfer that would require supplementation

## 2024-08-22 NOTE — LACTATION INITIAL EVALUATION - ACTUAL PROBLEM
knowledge deficit/delayed secretory activation

## 2024-08-22 NOTE — CONSULT NOTE ADULT - ASSESSMENT
35yo  33 weeks 2 days GDMA2 RICCARDO  from first trimester sono 8 weeks on  LMP 23 came in c/o ctxs pain since 2am, now in  labor, endorsed from previous shift. at 7am patient was 1cm, repeat sve 905am 3/90/-1/vtx/int. Patient denies VB, LOF. Reports fetal movement.  Hx of GDM on low dose lyburide now s/p c- section. Pt denies using meds prior to pregnancy    GDM- now fsg good control  rec no medical intervention  lifestyle changes reinforced  f/u with pcp in 6 wks

## 2024-08-22 NOTE — CONSULT NOTE ADULT - SUBJECTIVE AND OBJECTIVE BOX
Patient is a 34y old  Female who presents with a chief complaint of  labor (21 Aug 2024 08:59)      HPI:  OB PA H&P    33yo  33 weeks 2 days GDMA2 RICCARDO  from first trimester sono 8 weeks on  LMP 23 came in c/o ctxs pain since 2am, now in  labor, endorsed from previous shift. at 7am patient was 1cm, repeat sve 905am 3/90/-1/vtx/int. Patient denies VB, LOF. Reports fetal movement.    pnc dr poole   gdma2 on glyburide 2.5mg daily fasting fs <100, PP 140s  patient is SMA carrier, FOB alpha thalassemia silent carrier, amniocentesis done on  showed normal male  patient had positive urine culture on     GynHx:  PCOS was on metformin 500 mg daily prior to pregnancy, irregular menses  Reports possible STD and she received antibiotic treatment?  Denies fibroids, abnormal PAP  PMHx: denies  psx denies  Meds: PNV, melatonin  vitamin D  vitamin B12  Glyburide 2.5 mg tablets once daily  Allergies: no known allergies  (14 Aug 2024 09:40)      PAST MEDICAL & SURGICAL HISTORY:  PCOS (polycystic ovarian syndrome)      Gestational diabetes      No significant past surgical history             MEDICATIONS  (STANDING):  ascorbic acid 500 milliGRAM(s) Oral daily  ceFAZolin   IVPB 2000 milliGRAM(s) IV Intermittent once  dextrose 5%. 1000 milliLiter(s) (50 mL/Hr) IV Continuous <Continuous>  dextrose 50% Injectable 25 Gram(s) IV Push once  dextrose 50% Injectable 12.5 Gram(s) IV Push once  diphtheria/tetanus/pertussis (acellular) Vaccine (Adacel) 0.5 milliLiter(s) IntraMuscular once  famotidine    Tablet 20 milliGRAM(s) Oral two times a day  famotidine Injectable 20 milliGRAM(s) IV Push once  ferrous    sulfate 325 milliGRAM(s) Oral daily  glucagon  Injectable 1 milliGRAM(s) IntraMuscular once  heparin   Injectable 5000 Unit(s) SubCutaneous every 12 hours  insulin lispro (ADMELOG) corrective regimen sliding scale   SubCutaneous at bedtime  insulin lispro (ADMELOG) corrective regimen sliding scale   SubCutaneous three times a day with meals  oxytocin Infusion 333.333 milliUNIT(s)/Min (1000 mL/Hr) IV Continuous <Continuous>  prenatal multivitamin 1 Tablet(s) Oral daily    MEDICATIONS  (PRN):  acetaminophen     Tablet .. 975 milliGRAM(s) Oral every 6 hours PRN Temp greater or equal to 38C (100.4F), Mild Pain (1 - 3)  dextrose Oral Gel 15 Gram(s) Oral once PRN Blood Glucose LESS THAN 70 milliGRAM(s)/deciliter  diphenhydrAMINE 25 milliGRAM(s) Oral every 6 hours PRN Pruritus  ibuprofen  Tablet. 600 milliGRAM(s) Oral every 6 hours PRN Moderate Pain (4 - 6)  ketorolac   Injectable 30 milliGRAM(s) IV Push every 6 hours PRN Moderate Pain (4 - 6)  lanolin Ointment 1 Application(s) Topical every 6 hours PRN Sore Nipples  magnesium hydroxide Suspension 30 milliLiter(s) Oral two times a day PRN Constipation  oxyCODONE    IR 5 milliGRAM(s) Oral every 3 hours PRN Severe Pain (7 - 10)  simethicone 80 milliGRAM(s) Chew every 4 hours PRN Gas      FAMILY HISTORY:      SOCIAL HISTORY:      REVIEW OF SYSTEMS:  CONSTITUTIONAL: No fever, weight loss, or fatigue  EYES: No eye pain, visual disturbances, or discharge  ENT:  No difficulty hearing, tinnitus, vertigo; No sinus or throat pain  NECK: No pain or stiffness  RESPIRATORY: No cough, wheezing, chills or hemoptysis; No Shortness of Breath  CARDIOVASCULAR: No chest pain, palpitations, passing out, dizziness, or leg swelling  GASTROINTESTINAL: No abdominal or epigastric pain. No nausea, vomiting, or hematemesis; No diarrhea or constipation. No melena or hematochezia.  GENITOURINARY: No dysuria, frequency, hematuria, or incontinence  NEUROLOGICAL: No headaches, memory loss, loss of strength, numbness, or tremors  SKIN: No itching, burning, rashes, or lesions   LYMPH Nodes: No enlarged glands  ENDOCRINE: No heat or cold intolerance; No hair loss  MUSCULOSKELETAL: No joint pain or swelling; No muscle, back, or extremity pain  PSYCHIATRIC: No depression, anxiety, mood swings, or difficulty sleeping  HEME/LYMPH: No easy bruising, or bleeding gums  ALLERGY AND IMMUNOLOGIC: No hives or eczema	        Vital Signs Last 24 Hrs  T(C): 36.6 (22 Aug 2024 08:24), Max: 37 (21 Aug 2024 20:30)  T(F): 97.8 (22 Aug 2024 08:24), Max: 98.6 (21 Aug 2024 20:30)  HR: 71 (22 Aug 2024 08:24) (68 - 87)  BP: 115/74 (22 Aug 2024 08:24) (115/74 - 130/81)  BP(mean): --  RR: 18 (22 Aug 2024 08:24) (18 - 18)  SpO2: 99% (22 Aug 2024 08:24) (97% - 99%)    Parameters below as of 22 Aug 2024 08:24  Patient On (Oxygen Delivery Method): room air          Constitutional:    NC/AT:    HEENT:    Neck:  No JVD, bruits or thyromegaly    Respiratory:  Clear without rales or rhonchi    Cardiovascular:  RR without murmur, rub or gallop.    Gastrointestinal: Soft without hepatosplenomegaly.    Extremities: without cyanosis, clubbing or edema.    Neurological:  Oriented   x      . No gross sensory or motor defects.        LABS:                        11.0   9.84  )-----------( 152      ( 21 Aug 2024 07:45 )             33.0                   CAPILLARY BLOOD GLUCOSE      POCT Blood Glucose.: 82 mg/dL (22 Aug 2024 07:43)  POCT Blood Glucose.: 135 mg/dL (21 Aug 2024 21:11)  POCT Blood Glucose.: 88 mg/dL (21 Aug 2024 15:52)  POCT Blood Glucose.: 70 mg/dL (21 Aug 2024 11:45)      RADIOLOGY & ADDITIONAL STUDIES:   HPI:    35yo  33 weeks 2 days GDMA2 RICCARDO  from first trimester sono 8 weeks on  LMP 23 came in c/o ctxs pain since 2am, now in  labor, endorsed from previous shift. at 7am patient was 1cm, repeat sve 905am 3/90/-1/vtx/int. Patient denies VB, LOF. Reports fetal movement.  Hx of GDM on low dose lyburide now s/p c- section. Pt denies using meds prior to pregnancy     GynHx:  PCOS was on metformin 500 mg daily prior to pregnancy, irregular menses  Reports possible STD and she received antibiotic treatment?  Denies fibroids, abnormal PAP  PMHx: denies  psx denies  Meds: PNV, melatonin  vitamin D  vitamin B12  Glyburide 2.5 mg tablets once daily  Allergies: no known allergies  (14 Aug 2024 09:40)      PAST MEDICAL & SURGICAL HISTORY:  PCOS (polycystic ovarian syndrome)      Gestational diabetes      No significant past surgical history             MEDICATIONS  (STANDING):  ascorbic acid 500 milliGRAM(s) Oral daily  ceFAZolin   IVPB 2000 milliGRAM(s) IV Intermittent once  dextrose 5%. 1000 milliLiter(s) (50 mL/Hr) IV Continuous <Continuous>  dextrose 50% Injectable 25 Gram(s) IV Push once  dextrose 50% Injectable 12.5 Gram(s) IV Push once  diphtheria/tetanus/pertussis (acellular) Vaccine (Adacel) 0.5 milliLiter(s) IntraMuscular once  famotidine    Tablet 20 milliGRAM(s) Oral two times a day  famotidine Injectable 20 milliGRAM(s) IV Push once  ferrous    sulfate 325 milliGRAM(s) Oral daily  glucagon  Injectable 1 milliGRAM(s) IntraMuscular once  heparin   Injectable 5000 Unit(s) SubCutaneous every 12 hours  insulin lispro (ADMELOG) corrective regimen sliding scale   SubCutaneous at bedtime  insulin lispro (ADMELOG) corrective regimen sliding scale   SubCutaneous three times a day with meals  oxytocin Infusion 333.333 milliUNIT(s)/Min (1000 mL/Hr) IV Continuous <Continuous>  prenatal multivitamin 1 Tablet(s) Oral daily    MEDICATIONS  (PRN):  acetaminophen     Tablet .. 975 milliGRAM(s) Oral every 6 hours PRN Temp greater or equal to 38C (100.4F), Mild Pain (1 - 3)  dextrose Oral Gel 15 Gram(s) Oral once PRN Blood Glucose LESS THAN 70 milliGRAM(s)/deciliter  diphenhydrAMINE 25 milliGRAM(s) Oral every 6 hours PRN Pruritus  ibuprofen  Tablet. 600 milliGRAM(s) Oral every 6 hours PRN Moderate Pain (4 - 6)  ketorolac   Injectable 30 milliGRAM(s) IV Push every 6 hours PRN Moderate Pain (4 - 6)  lanolin Ointment 1 Application(s) Topical every 6 hours PRN Sore Nipples  magnesium hydroxide Suspension 30 milliLiter(s) Oral two times a day PRN Constipation  oxyCODONE    IR 5 milliGRAM(s) Oral every 3 hours PRN Severe Pain (7 - 10)  simethicone 80 milliGRAM(s) Chew every 4 hours PRN Gas      FAMILY HISTORY:      SOCIAL HISTORY:      REVIEW OF SYSTEMS:  CONSTITUTIONAL: No fever, weight loss, or fatigue  EYES: No eye pain, visual disturbances, or discharge  ENT:  No difficulty hearing, tinnitus, vertigo; No sinus or throat pain  NECK: No pain or stiffness  RESPIRATORY: No cough, wheezing, chills or hemoptysis; No Shortness of Breath  CARDIOVASCULAR: No chest pain, palpitations, passing out, dizziness, or leg swelling  GASTROINTESTINAL: No abdominal or epigastric pain. No nausea, vomiting, or hematemesis; No diarrhea or constipation. No melena or hematochezia.  GENITOURINARY: No dysuria, frequency, hematuria, or incontinence  NEUROLOGICAL: No headaches, memory loss, loss of strength, numbness, or tremors  SKIN: No itching, burning, rashes, or lesions   LYMPH Nodes: No enlarged glands  ENDOCRINE: No heat or cold intolerance; No hair loss  MUSCULOSKELETAL: No joint pain or swelling; No muscle, back, or extremity pain  PSYCHIATRIC: No depression, anxiety, mood swings, or difficulty sleeping  HEME/LYMPH: No easy bruising, or bleeding gums  ALLERGY AND IMMUNOLOGIC: No hives or eczema	        Vital Signs Last 24 Hrs  T(C): 36.6 (22 Aug 2024 08:24), Max: 37 (21 Aug 2024 20:30)  T(F): 97.8 (22 Aug 2024 08:24), Max: 98.6 (21 Aug 2024 20:30)  HR: 71 (22 Aug 2024 08:24) (68 - 87)  BP: 115/74 (22 Aug 2024 08:24) (115/74 - 130/81)  BP(mean): --  RR: 18 (22 Aug 2024 08:24) (18 - 18)  SpO2: 99% (22 Aug 2024 08:24) (97% - 99%)    Parameters below as of 22 Aug 2024 08:24  Patient On (Oxygen Delivery Method): room air          Constitutional:    HEENT: nad    Neck:  No JVD, bruits or thyromegaly    Respiratory:  Clear without rales or rhonchi    Cardiovascular:  RR without murmur, rub or gallop.    Gastrointestinal: Soft without hepatosplenomegaly.    Extremities: without cyanosis, clubbing or edema.    Neurological:  Oriented   x 3     . No gross sensory or motor defects.        LABS:                        11.0   9.84  )-----------( 152      ( 21 Aug 2024 07:45 )             33.0                   CAPILLARY BLOOD GLUCOSE      POCT Blood Glucose.: 82 mg/dL (22 Aug 2024 07:43)  POCT Blood Glucose.: 135 mg/dL (21 Aug 2024 21:11)  POCT Blood Glucose.: 88 mg/dL (21 Aug 2024 15:52)  POCT Blood Glucose.: 70 mg/dL (21 Aug 2024 11:45)      RADIOLOGY & ADDITIONAL STUDIES:

## 2024-08-22 NOTE — LACTATION INITIAL EVALUATION - AS EVIDENCED BY
NICU 34 weeks 1 day/patient stated/observation/infant  from mother
NICU 34 weeks 1 day/patient stated/observation/prematurity/infant  from mother
NICU 34 weeks 1 day/patient stated/observation/prematurity/infant  from mother

## 2024-08-22 NOTE — PROGRESS NOTE ADULT - SUBJECTIVE AND OBJECTIVE BOX
Patient seen at bedside resting comfortably offers no new complaints. + Ambulation, + void without difficulty, + flatus;  + bm;  tolerating regular diet. Pt both breastfeeding and bottle feeding. Pt denies headache, blurry vision or epigastric pain, chest pain, shortness of breath, N/V/D,  dizziness, palpitations, worsening vaginal bleeding.     Vital Signs Last 24 Hrs  T(C): 36.6 (22 Aug 2024 08:24), Max: 37 (21 Aug 2024 20:30)  T(F): 97.8 (22 Aug 2024 08:24), Max: 98.6 (21 Aug 2024 20:30)  HR: 71 (22 Aug 2024 08:24) (68 - 87)  BP: 115/74 (22 Aug 2024 08:24) (115/74 - 130/81)  RR: 18 (22 Aug 2024 08:24) (18 - 18)  SpO2: 99% (22 Aug 2024 08:24) (97% - 99%)  Parameters below as of 22 Aug 2024 08:24  Patient On (Oxygen Delivery Method): room air        Gen: A&O x 3, NAD  Chest: CTA B/L  Cardiac: S1,S2  RRR  Breast: Soft, nontender, nonengorged  Abdomen: soft; Nontender, nondistended, Incision C/D/I steri strips in place   Gyn: Minimal lochia  Extremities: Nontender, no worsening edema                          11.0   9.84  )-----------( 152      ( 21 Aug 2024 07:45 )             33.0

## 2024-08-22 NOTE — PROGRESS NOTE ADULT - PROBLEM SELECTOR PROBLEM 3
Gestational thrombocytopenia

## 2024-08-22 NOTE — LACTATION INITIAL EVALUATION - POTENTIAL FOR
knowledge deficit/feeding confusion/latch on difficulty/delayed secretory activation

## 2024-08-22 NOTE — PROGRESS NOTE ADULT - ASSESSMENT
A/P: Patient is a 34 year old  at 33w3d admitted for  labor.  uncontrolled GDMA2, gestational thrombocytopenia and gHTN.      
hD #4 PTL; ctxs since 2am;efw 2352 GHTN    GDMA2 was on glyburide 2.5mg daily now on insulin due to the betamethasone     - s/p 2doses betamethasone     - NST reassuring/ bpp reassuring    - no more PTC at this time    - will reassess w mfm if needs to continue insulin 
A/P: 35 yo POD #1 s/p urgent c/s @ @ 34.1 weeks for category II tracing, patient presented in  labor, history of GHTN, GDMA2  s/p betamethasone x 2, s/p mag, s/p nifedipine x 48 hours, s/p amp, urine culture negative, vaginal culture negative   Otherwise uncomplicated delivery and postpartum course     -Pain management as needed  - fingersticks q ac/hs  -sliding scale insulin   -DVT ppx: OOB and ambulate, heparin  -Advance diet to regular  -Encourage breastfeeding   -d/w Dr. [place]  
A/P: 35 yo POD #3 s/p urgent c/s @ 34.1 weeks for category II tracing, patient presented in  labor, history of GHTN, GDMA2  s/p betamethasone x 2, s/p mag, s/p nifedipine x 48 hours, s/p amp, urine culture negative, vaginal culture negative   Patient seen by endocrinologist Dr. Trevino, no medications recommended   Seen by case management for blood pressure management   Otherwise uncomplicated delivery and postpartum course    -d/c home   -instructions verbalized  -follow up in 1-2weeks in office for incision check  -d/w Dr. Tapia 
A/P: POD #2 s/p c/s; Gdma-2 and ghtn  Endo  notified  case management  -Pain management as needed  -cont post op care  -OOB and ambulate  - f/u Rpt CBC   -encourage insentive spirometer use  -d/w dr. Alex woo  
A/p: 33 yo POD #0 s/p urgent c/s @ 34.1 weeks for category II tracing, GHTN, gestational thrombocytopenia, GDMA2, PCOS  s/p betamethasone x2, s/p mag, s/p nifedipine x 48 hours, s/p ampicillin, vaginal cx negative, urine culture negative     - cbc/bmp 1800  - fingersticks q ac/hs  - sliding scale insulin   -continue close monitor   -continue post op care  - continue monitoring FS, consult endo tomorrow if needed   -d/w Dr. Sommer

## 2024-08-22 NOTE — PROGRESS NOTE ADULT - PROBLEM SELECTOR PROBLEM 2
Gestational diabetes

## 2024-08-22 NOTE — LACTATION INITIAL EVALUATION - AS DELIV COMPLICATIONS OB
abnormal fetal heart rate tracing
see delivery summary/abnormal fetal heart rate tracing
see delivery summary/abnormal fetal heart rate tracing

## 2024-08-22 NOTE — LACTATION INITIAL EVALUATION - NS LACT CON REASON FOR REQ
34 weeks 1 day/primaparous mom/premature infant
34 weeks 1 day/primaparous mom/premature infant/follow up consultation
34 weeks 1 day/primaparous mom/premature infant/follow up consultation

## 2024-08-24 LAB
CULTURE RESULTS: ABNORMAL
CULTURE RESULTS: ABNORMAL
ORGANISM # SPEC MICROSCOPIC CNT: ABNORMAL
ORGANISM # SPEC MICROSCOPIC CNT: ABNORMAL
SPECIMEN SOURCE: SIGNIFICANT CHANGE UP
SPECIMEN SOURCE: SIGNIFICANT CHANGE UP

## 2024-09-27 ENCOUNTER — EMERGENCY (EMERGENCY)
Facility: HOSPITAL | Age: 34
LOS: 1 days | Discharge: HOME | End: 2024-09-27
Attending: EMERGENCY MEDICINE
Payer: MEDICAID

## 2024-09-27 VITALS
DIASTOLIC BLOOD PRESSURE: 79 MMHG | SYSTOLIC BLOOD PRESSURE: 123 MMHG | HEIGHT: 63 IN | TEMPERATURE: 98 F | HEART RATE: 65 BPM | OXYGEN SATURATION: 99 % | RESPIRATION RATE: 16 BRPM | WEIGHT: 185.19 LBS

## 2024-09-27 VITALS
SYSTOLIC BLOOD PRESSURE: 103 MMHG | OXYGEN SATURATION: 97 % | DIASTOLIC BLOOD PRESSURE: 64 MMHG | RESPIRATION RATE: 18 BRPM | HEART RATE: 70 BPM | TEMPERATURE: 97 F

## 2024-09-27 PROBLEM — O24.419 GESTATIONAL DIABETES MELLITUS IN PREGNANCY, UNSPECIFIED CONTROL: Chronic | Status: ACTIVE | Noted: 2024-08-14

## 2024-09-27 LAB
ALBUMIN SERPL ELPH-MCNC: 3.2 G/DL — LOW (ref 3.5–5)
ALP SERPL-CCNC: 70 U/L — SIGNIFICANT CHANGE UP (ref 40–120)
ALT FLD-CCNC: 31 U/L DA — SIGNIFICANT CHANGE UP (ref 10–60)
ANION GAP SERPL CALC-SCNC: 6 MMOL/L — SIGNIFICANT CHANGE UP (ref 5–17)
AST SERPL-CCNC: 17 U/L — SIGNIFICANT CHANGE UP (ref 10–40)
BASOPHILS # BLD AUTO: 0.03 K/UL — SIGNIFICANT CHANGE UP (ref 0–0.2)
BASOPHILS NFR BLD AUTO: 0.4 % — SIGNIFICANT CHANGE UP (ref 0–2)
BILIRUB SERPL-MCNC: 0.4 MG/DL — SIGNIFICANT CHANGE UP (ref 0.2–1.2)
BUN SERPL-MCNC: 9 MG/DL — SIGNIFICANT CHANGE UP (ref 7–18)
CALCIUM SERPL-MCNC: 9.2 MG/DL — SIGNIFICANT CHANGE UP (ref 8.4–10.5)
CHLORIDE SERPL-SCNC: 103 MMOL/L — SIGNIFICANT CHANGE UP (ref 96–108)
CO2 SERPL-SCNC: 29 MMOL/L — SIGNIFICANT CHANGE UP (ref 22–31)
CREAT SERPL-MCNC: 0.64 MG/DL — SIGNIFICANT CHANGE UP (ref 0.5–1.3)
EGFR: 120 ML/MIN/1.73M2 — SIGNIFICANT CHANGE UP
EOSINOPHIL # BLD AUTO: 0.08 K/UL — SIGNIFICANT CHANGE UP (ref 0–0.5)
EOSINOPHIL NFR BLD AUTO: 1.1 % — SIGNIFICANT CHANGE UP (ref 0–6)
GLUCOSE SERPL-MCNC: 93 MG/DL — SIGNIFICANT CHANGE UP (ref 70–99)
HCT VFR BLD CALC: 37.1 % — SIGNIFICANT CHANGE UP (ref 34.5–45)
HGB BLD-MCNC: 11.9 G/DL — SIGNIFICANT CHANGE UP (ref 11.5–15.5)
IMM GRANULOCYTES NFR BLD AUTO: 0.3 % — SIGNIFICANT CHANGE UP (ref 0–0.9)
LYMPHOCYTES # BLD AUTO: 2.19 K/UL — SIGNIFICANT CHANGE UP (ref 1–3.3)
LYMPHOCYTES # BLD AUTO: 30.5 % — SIGNIFICANT CHANGE UP (ref 13–44)
MCHC RBC-ENTMCNC: 25.5 PG — LOW (ref 27–34)
MCHC RBC-ENTMCNC: 32.1 GM/DL — SIGNIFICANT CHANGE UP (ref 32–36)
MCV RBC AUTO: 79.6 FL — LOW (ref 80–100)
MONOCYTES # BLD AUTO: 0.55 K/UL — SIGNIFICANT CHANGE UP (ref 0–0.9)
MONOCYTES NFR BLD AUTO: 7.6 % — SIGNIFICANT CHANGE UP (ref 2–14)
NEUTROPHILS # BLD AUTO: 4.32 K/UL — SIGNIFICANT CHANGE UP (ref 1.8–7.4)
NEUTROPHILS NFR BLD AUTO: 60.1 % — SIGNIFICANT CHANGE UP (ref 43–77)
NRBC # BLD: 0 /100 WBCS — SIGNIFICANT CHANGE UP (ref 0–0)
PLATELET # BLD AUTO: 147 K/UL — LOW (ref 150–400)
POTASSIUM SERPL-MCNC: 4.3 MMOL/L — SIGNIFICANT CHANGE UP (ref 3.5–5.3)
POTASSIUM SERPL-SCNC: 4.3 MMOL/L — SIGNIFICANT CHANGE UP (ref 3.5–5.3)
PROT SERPL-MCNC: 8.1 G/DL — SIGNIFICANT CHANGE UP (ref 6–8.3)
RBC # BLD: 4.66 M/UL — SIGNIFICANT CHANGE UP (ref 3.8–5.2)
RBC # FLD: 13.3 % — SIGNIFICANT CHANGE UP (ref 10.3–14.5)
SODIUM SERPL-SCNC: 138 MMOL/L — SIGNIFICANT CHANGE UP (ref 135–145)
TROPONIN I, HIGH SENSITIVITY RESULT: 3.4 NG/L — SIGNIFICANT CHANGE UP
WBC # BLD: 7.19 K/UL — SIGNIFICANT CHANGE UP (ref 3.8–10.5)
WBC # FLD AUTO: 7.19 K/UL — SIGNIFICANT CHANGE UP (ref 3.8–10.5)

## 2024-09-27 PROCEDURE — 84484 ASSAY OF TROPONIN QUANT: CPT

## 2024-09-27 PROCEDURE — 36415 COLL VENOUS BLD VENIPUNCTURE: CPT

## 2024-09-27 PROCEDURE — 99285 EMERGENCY DEPT VISIT HI MDM: CPT

## 2024-09-27 PROCEDURE — 80053 COMPREHEN METABOLIC PANEL: CPT

## 2024-09-27 PROCEDURE — 85025 COMPLETE CBC W/AUTO DIFF WBC: CPT

## 2024-09-27 PROCEDURE — 71046 X-RAY EXAM CHEST 2 VIEWS: CPT | Mod: 26

## 2024-09-27 PROCEDURE — 99285 EMERGENCY DEPT VISIT HI MDM: CPT | Mod: 25

## 2024-09-27 PROCEDURE — 71046 X-RAY EXAM CHEST 2 VIEWS: CPT

## 2024-09-27 PROCEDURE — 93005 ELECTROCARDIOGRAM TRACING: CPT

## 2024-09-27 RX ORDER — LIDOCAINE/BENZALKONIUM/ALCOHOL
1 SOLUTION, NON-ORAL TOPICAL
Qty: 1 | Refills: 0
Start: 2024-09-27 | End: 2024-09-29

## 2024-09-27 RX ORDER — LIDOCAINE/BENZALKONIUM/ALCOHOL
1 SOLUTION, NON-ORAL TOPICAL ONCE
Refills: 0 | Status: COMPLETED | OUTPATIENT
Start: 2024-09-27 | End: 2024-09-27

## 2024-09-27 RX ADMIN — Medication 1 PATCH: at 12:55

## 2024-09-27 NOTE — ED PROVIDER NOTE - CLINICAL SUMMARY MEDICAL DECISION MAKING FREE TEXT BOX
Patient presenting with right-sided neck shoulder and chest pain that seems musculoskeletal based off of exam.  Given patient's history of gestational diabetes will obtain screening labs troponin and x-ray to screen for active cardiopulmonary disease.  EKG does not have any concerning changes on my independent interpretation.  If workup is unremarkable will recommend outpatient follow-up.

## 2024-09-27 NOTE — ED PROVIDER NOTE - PATIENT PORTAL LINK FT
You can access the FollowMyHealth Patient Portal offered by [facility] by registering at the following website: [email or web address]. By joining [facility]’s FollowMyHealth portal, you will also be able to view your health information using other applications (apps) compatible with our system.

## 2024-09-27 NOTE — ED PROVIDER NOTE - NSFOLLOWUPINSTRUCTIONS_ED_ALL_ED_FT
Thank you for choosing [facility] for your healthcare.    You were seen in the Emergency Department for pain in your neck and shoulder.  In the emergency department you had blood work, an EKG and an x-ray which showed no signs of a heart attack or other life-threatening cause of your symptoms.  We treated you with a lidocaine patch which helped you feel better.  We are sending you a prescription for these to continue using them at home.  If your insurance is not able to fill out these can also be purchased without a prescription.  Please follow-up with your primary care doctor if your symptoms do not feel better on their own.  Please return to the emergency department for any further emergent or concerning medical issues.

## 2024-09-27 NOTE — ED ADULT NURSE NOTE - NSFALLUNIVINTERV_ED_ALL_ED
Bed/Stretcher in lowest position, wheels locked, appropriate side rails in place/Call bell, personal items and telephone in reach/Instruct patient to call for assistance before getting out of bed/chair/stretcher/Non-slip footwear applied when patient is off stretcher/[place] to call system/Physically safe environment - no spills, clutter or unnecessary equipment/Purposeful proactive rounding/Room/bathroom lighting operational, light cord in reach

## 2024-09-27 NOTE — ED ADULT NURSE NOTE - OBJECTIVE STATEMENT
Pt c/o upper back pain radiating to right shoulder and chest. Pt denies nausea/vomiting, pt states "I cant swallow, it feel like a ball in my throat". Pt pmh: gestational diabetes. Pt denies urinary symptoms. Pt last bowel movement 9/27/24.

## 2024-09-27 NOTE — ED PROVIDER NOTE - PHYSICAL EXAMINATION
Exam:  General: Patient well appearing, vital signs within normal limits  HEENT: airway patent with moist mucous membranes  Cardiac: RRR S1/S2 with strong peripheral pulses  Respiratory: lungs clear without respiratory distress  GI: abdomen soft, non tender, non distended  Neuro: no gross neurologic deficits  MSK: point tenderness R trapezius reproduces complaint as does active ROM of R shoulder, no deformity

## 2024-09-27 NOTE — ED ADULT NURSE NOTE - PRIMARY CARE PROVIDER
Pt here to have vital signs checked as ordered by Dr. Beebe. See VS flow sheet for more information. Pt dc'd home and will follow up as previously discussed with her provider. Message sent to the provider to make her aware of vital signs.  
sonya

## 2024-09-27 NOTE — ED PROVIDER NOTE - OBJECTIVE STATEMENT
34-year-old woman with a past medical history of gestational diabetes presenting reporting 2 days of right-sided posterior neck and shoulder pain radiating forward into the chest.  No associated exertional component or shortness of breath.  Pain is worse with changing position or ranging right shoulder.  Has not tried any medications to date for symptoms.  No known history of cardiac disease.  No fevers chills cough.

## 2024-09-27 NOTE — ED PROVIDER NOTE - PROGRESS NOTE DETAILS
Patient reporting feeling improved with lidocaine patch.  No emergently actionable findings on workup in Emergency Department.  Will discharge with lidocaine patch.

## 2025-04-29 NOTE — LACTATION INITIAL EVALUATION - BREAST TRAUMA OR BURNS
[facility] message sent to patient that referral has been sent for her Dexa and she may call directly to schedule if she would like at [phone].  
no